# Patient Record
Sex: FEMALE | Race: WHITE | Employment: OTHER | URBAN - METROPOLITAN AREA
[De-identification: names, ages, dates, MRNs, and addresses within clinical notes are randomized per-mention and may not be internally consistent; named-entity substitution may affect disease eponyms.]

---

## 2018-05-02 DIAGNOSIS — Z12.31 VISIT FOR SCREENING MAMMOGRAM: Primary | ICD-10-CM

## 2018-05-02 DIAGNOSIS — Z85.3 PERSONAL HISTORY OF BREAST CANCER: ICD-10-CM

## 2018-05-25 ASSESSMENT — ENCOUNTER SYMPTOMS
BLOOD IN STOOL: 0
ABDOMINAL DISTENTION: 0
EYE DISCHARGE: 0
NAUSEA: 0
COLOR CHANGE: 0
TROUBLE SWALLOWING: 0
COUGH: 0
SORE THROAT: 0
EYE ITCHING: 0
SINUS PRESSURE: 0
CHOKING: 0
RHINORRHEA: 0
ABDOMINAL PAIN: 0
DIARRHEA: 0
CONSTIPATION: 0
VOMITING: 0
SHORTNESS OF BREATH: 0
BACK PAIN: 0
WHEEZING: 0
VOICE CHANGE: 0
SINUS PAIN: 0
ROS SKIN COMMENTS: DENIES BREAST SKIN CHANGES, ARM SWELLING, OR PALPABLE AXILLARY OR SUPRACLAVICULAR ADENOPATHY.
CHEST TIGHTNESS: 0

## 2018-06-07 ENCOUNTER — HOSPITAL ENCOUNTER (OUTPATIENT)
Dept: GENERAL RADIOLOGY | Age: 58
Discharge: HOME | End: 2018-06-09
Payer: COMMERCIAL

## 2018-06-07 ENCOUNTER — OFFICE VISIT (OUTPATIENT)
Dept: BREAST CENTER | Age: 58
End: 2018-06-07
Payer: COMMERCIAL

## 2018-06-07 VITALS
DIASTOLIC BLOOD PRESSURE: 78 MMHG | SYSTOLIC BLOOD PRESSURE: 120 MMHG | TEMPERATURE: 98 F | BODY MASS INDEX: 31.93 KG/M2 | OXYGEN SATURATION: 98 % | HEART RATE: 88 BPM | RESPIRATION RATE: 16 BRPM | WEIGHT: 173.5 LBS | HEIGHT: 62 IN

## 2018-06-07 DIAGNOSIS — Z12.31 VISIT FOR SCREENING MAMMOGRAM: ICD-10-CM

## 2018-06-07 DIAGNOSIS — Z85.3 PERSONAL HISTORY OF BREAST CANCER: ICD-10-CM

## 2018-06-07 DIAGNOSIS — D05.12 DUCTAL CARCINOMA IN SITU (DCIS) OF LEFT BREAST: Primary | ICD-10-CM

## 2018-06-07 PROCEDURE — 99213 OFFICE O/P EST LOW 20 MIN: CPT | Performed by: NURSE PRACTITIONER

## 2018-06-07 PROCEDURE — 77067 SCR MAMMO BI INCL CAD: CPT

## 2019-05-23 ASSESSMENT — ENCOUNTER SYMPTOMS
CHEST TIGHTNESS: 0
BLOOD IN STOOL: 0
ABDOMINAL DISTENTION: 0
ABDOMINAL PAIN: 0
NAUSEA: 0
WHEEZING: 0
SINUS PAIN: 0
RHINORRHEA: 0
VOICE CHANGE: 0
SORE THROAT: 0
CHOKING: 0
SHORTNESS OF BREATH: 0
TROUBLE SWALLOWING: 0
VOMITING: 0
SINUS PRESSURE: 0
ROS SKIN COMMENTS: DENIES BREAST SKIN CHANGES, ARM SWELLING, OR PALPABLE AXILLARY OR SUPRACLAVICULAR ADENOPATHY.
CONSTIPATION: 0
EYE ITCHING: 0
BACK PAIN: 0
EYE DISCHARGE: 0
COLOR CHANGE: 0
COUGH: 0
DIARRHEA: 0

## 2019-05-23 NOTE — PROGRESS NOTES
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 Patient ID: Emmanuel Robledo is a 60 y.o. female. HPI        She underwent left breast needle localized lumpectomy on February 27, 2013. 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 Surgical margins negative for malignancy. Estrogen Receptor (ER) Negative (0%), Progesterone Receptor (IN) Negative (0%). TNM classification- pTis, pNX, pMX. Estimated body mass index is 31.73 kg/m² as calculated from the following:    Height as of 6/7/18: 5' 2\" (1.575 m). Weight as of 6/7/18: 173 lb 8 oz (78.7 kg). 3/26/14 Medical Oncology update, Cheri Monzon MD:   Chance Good is a very pleasant 46 yo healthy female with newly diagnosed DCIS, that is high nuclear grade and ER/IN negative. Size is small. She underwent lumpectomy on 2/27/13. Healing well from her surgery  The benefit of tamoxifen in ER/IN negative DCIS is highly questionable. In large retrospective analysis of tamoxifen vs placebo for DCIS, a statistically significant reduction in any breast cancer events. However, ER/IN negative DCIS had only a non-statistically significant trend toward reduction in breast cancer events, only likely because of the high false negative ER status testing (condordance rate between institutiona and central testing was 70%). Therefore, tamoxifen will likely not affect her recurrence risk with hormone receptor negative DCIS. Adjuvant WBRT was 4/22/13 through 6/17/13. May 2014 Bilateral Diagnostic Mammogram  MAMMOGRAM FINDINGS:   There is a post-surgical scar seen in the central region of the left breast.   No suspicious masses, calcifications, or other abnormalities are seen. IMPRESSION:   Post-surgical scar in the left breast is benign. Screening mammogram in 1 year is recommended.      4/13/2015 Bilateral diagnostic mammogram, [facility]  MAMMOGRAM FINDINGS:   There are new are normal. Right eye exhibits no discharge. Left eye exhibits no discharge. No scleral icterus. Neck: Normal range of motion. Neck supple. No JVD present. No tracheal deviation present. No thyromegaly present. Cardiovascular: Normal rate and regular rhythm. Exam reveals no gallop and no friction rub. No murmur heard. Pulmonary/Chest: Effort normal and breath sounds normal. No stridor. No respiratory distress. She has no wheezes. She has no rales. She exhibits no mass, no tenderness, no bony tenderness, no laceration, no edema, no deformity, no swelling and no retraction. Right breast exhibits no inverted nipple, no mass, no nipple discharge, no skin change and no tenderness. Left breast exhibits no inverted nipple, no mass, no nipple discharge, no skin change and no tenderness. Breasts are symmetrical.   Breasts are supple bilaterally. No skin dimpling or puckering. No nipple discharge. No clinically suspicious  lumps nodules or masses appreciated. No axillary lymphadenopathy. Abdominal: Soft. She exhibits no distension. There is no tenderness. There is no rebound and no guarding. Musculoskeletal: Normal range of motion. She exhibits no edema, tenderness or deformity. Right shoulder: Normal.        Left shoulder: Normal.   Lymphadenopathy:     She has no cervical adenopathy. Right cervical: No superficial cervical, no deep cervical and no posterior cervical adenopathy present. Left cervical: No superficial cervical, no deep cervical and no posterior cervical adenopathy present. She has no axillary adenopathy. Right axillary: No pectoral and no lateral adenopathy present. Left axillary: No pectoral and no lateral adenopathy present. Right: No supraclavicular adenopathy present. Left: No supraclavicular adenopathy present. Neurological: She is alert and oriented to person, place, and time. Coordination normal.   Skin: Skin is warm and dry.  No rash noted. She is not diaphoretic. No erythema. No pallor. Psychiatric: She has a normal mood and affect. Her behavior is normal. Judgment and thought content normal.   Nursing note and vitals reviewed. Assessment:      60 y.o. woman without unusual risk factors for carcinoma of the breast, who underwent left breast needle localized lumpectomy on February 27, 2013. 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 Surgical margins negative for malignancy. Estrogen Receptor Negative (0%), Progesterone Receptor Negative (0%), TNM classification- pTis, pNX, pMX.     -Completed RT June 17, 2013.   -Bilateral screening mammogram today, 06/10/2019:  Benign.        -Clinically, she continues to do well and there is no  evidence of recurrence.  -Follows with Dr. Belkys Felix and Dr. Uli Ewing. Reviewed NCCN guidelines for follow up:            Plan:      1. Continue monthly breast self examination. Bring any changes to your physician's attention. 2. Continue healthy diet and exercise routinely as tolerated. 3. Avoid alcohol or limit alcohol intake to < 1 drink per day. 4. Repeat mammogram 1 year. (ordered). 5. Continue follow up with Primary Care. 6. RTC Prn.  7. Follow up with  and Dr. Uli Ewing as directed. During today's visit, face-to-face time 15 minutes, greater than 50% in counseling education and coordination of care. All questions were answered to her apparent satisfaction, and she is agreeable to the plan as outlined above. Suzan Ruiz, RN, MSN, ACNP-BC, AOCNP  Advanced Oncology Certified Nurse Practitioner  Department of Breast Surgery  [facility]/  [facility] in collaboration with Dr. Rudolph Zamudio.  Tobin Gudino

## 2019-06-10 ENCOUNTER — HOSPITAL ENCOUNTER (OUTPATIENT)
Dept: GENERAL RADIOLOGY | Age: 59
Discharge: HOME | End: 2019-06-12
Payer: COMMERCIAL

## 2019-06-10 ENCOUNTER — OFFICE VISIT (OUTPATIENT)
Dept: BREAST CENTER | Age: 59
End: 2019-06-10
Payer: COMMERCIAL

## 2019-06-10 VITALS
WEIGHT: 172 LBS | HEART RATE: 68 BPM | SYSTOLIC BLOOD PRESSURE: 108 MMHG | DIASTOLIC BLOOD PRESSURE: 62 MMHG | TEMPERATURE: 98.7 F | RESPIRATION RATE: 16 BRPM | OXYGEN SATURATION: 98 % | BODY MASS INDEX: 31.65 KG/M2 | HEIGHT: 62 IN

## 2019-06-10 DIAGNOSIS — Z85.3 PERSONAL HISTORY OF BREAST CANCER: ICD-10-CM

## 2019-06-10 PROCEDURE — 77063 BREAST TOMOSYNTHESIS BI: CPT

## 2019-06-10 PROCEDURE — 99213 OFFICE O/P EST LOW 20 MIN: CPT | Performed by: NURSE PRACTITIONER

## 2019-06-10 RX ORDER — LISINOPRIL 5 MG/1
5 TABLET ORAL DAILY
COMMUNITY
End: 2022-01-18 | Stop reason: ALTCHOICE

## 2020-08-30 ENCOUNTER — APPOINTMENT (OUTPATIENT)
Dept: GENERAL RADIOLOGY | Age: 60
End: 2020-08-30
Payer: COMMERCIAL

## 2020-08-30 ENCOUNTER — HOSPITAL ENCOUNTER (EMERGENCY)
Age: 60
Discharge: HOME | End: 2020-08-30
Attending: EMERGENCY MEDICINE
Payer: COMMERCIAL

## 2020-08-30 VITALS
WEIGHT: 172 LBS | TEMPERATURE: 98 F | HEART RATE: 70 BPM | BODY MASS INDEX: 31.65 KG/M2 | DIASTOLIC BLOOD PRESSURE: 72 MMHG | SYSTOLIC BLOOD PRESSURE: 120 MMHG | HEIGHT: 62 IN | RESPIRATION RATE: 20 BRPM | OXYGEN SATURATION: 98 %

## 2020-08-30 PROCEDURE — 99283 EMERGENCY DEPT VISIT LOW MDM: CPT

## 2020-08-30 PROCEDURE — 6370000000 HC RX 637 (ALT 250 FOR IP): Performed by: EMERGENCY MEDICINE

## 2020-08-30 PROCEDURE — 94640 AIRWAY INHALATION TREATMENT: CPT

## 2020-08-30 PROCEDURE — 99284 EMERGENCY DEPT VISIT MOD MDM: CPT

## 2020-08-30 PROCEDURE — 71045 X-RAY EXAM CHEST 1 VIEW: CPT

## 2020-08-30 PROCEDURE — 94664 DEMO&/EVAL PT USE INHALER: CPT

## 2020-08-30 RX ORDER — METHYLPREDNISOLONE 4 MG/1
TABLET ORAL
Qty: 1 KIT | Refills: 0 | Status: SHIPPED | OUTPATIENT
Start: 2020-08-30 | End: 2020-09-05

## 2020-08-30 RX ORDER — PREDNISONE 20 MG/1
60 TABLET ORAL ONCE
Status: COMPLETED | OUTPATIENT
Start: 2020-08-30 | End: 2020-08-30

## 2020-08-30 RX ORDER — IPRATROPIUM BROMIDE AND ALBUTEROL SULFATE 2.5; .5 MG/3ML; MG/3ML
3 SOLUTION RESPIRATORY (INHALATION) ONCE
Status: COMPLETED | OUTPATIENT
Start: 2020-08-30 | End: 2020-08-30

## 2020-08-30 RX ADMIN — PREDNISONE 60 MG: 20 TABLET ORAL at 12:59

## 2020-08-30 RX ADMIN — IPRATROPIUM BROMIDE AND ALBUTEROL SULFATE 3 AMPULE: .5; 3 SOLUTION RESPIRATORY (INHALATION) at 13:14

## 2020-08-30 ASSESSMENT — ENCOUNTER SYMPTOMS
ORTHOPNEA: 0
RHINORRHEA: 0
WHEEZING: 1
SWOLLEN GLANDS: 0
CHEST TIGHTNESS: 0
SHORTNESS OF BREATH: 0
SORE THROAT: 0
SPUTUM PRODUCTION: 0

## 2020-08-30 NOTE — ED NOTES
Bed: 02  Expected date:   Expected time:   Means of arrival:   Comments:  Low acuity     Elmer Vang RN  08/30/20 0096

## 2020-08-30 NOTE — ED PROVIDER NOTES
79-year-old female presents emergency department with cough and wheezing. Patient states this been going on for several months but seems like it is little bit worse over the last day or 2. 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 States no other complaints at this time. States no leg swelling no history of PEs. The history is provided by the patient. Wheezing   Severity:  Mild  Severity compared to prior episodes:  Less severe  Onset quality:  Gradual  Timing:  Intermittent  Progression:  Waxing and waning  Chronicity:  New  Relieved by:  Nothing  Worsened by:  Nothing  Ineffective treatments:  None tried  Associated symptoms: no chest pain, no chest tightness, no fatigue, no fever, no headaches, no orthopnea, no PND, no rhinorrhea, no shortness of breath, no sore throat, no sputum production and no swollen glands         Review of Systems   Constitutional: Negative for fatigue and fever. HENT: Negative for rhinorrhea and sore throat. Respiratory: Positive for wheezing. Negative for sputum production, chest tightness and shortness of breath. Cardiovascular: Negative for chest pain, orthopnea and PND. Neurological: Negative for headaches. Physical Exam  Constitutional:       Appearance: Normal appearance. HENT:      Head: Normocephalic and atraumatic. Mouth/Throat:      Mouth: Mucous membranes are moist.   Eyes:      Extraocular Movements: Extraocular movements intact. Pupils: Pupils are equal, round, and reactive to light. Cardiovascular:      Rate and Rhythm: Normal rate and regular rhythm. Pulses: Normal pulses. Heart sounds: Normal heart sounds. Pulmonary:      Effort: No respiratory distress. Breath sounds: Wheezing present. Abdominal:      General: Abdomen is flat. Bowel sounds are normal.      Palpations: Abdomen is soft. Tenderness:  There is no abdominal tenderness. Musculoskeletal:      Right lower leg: No edema. Left lower leg: No edema. Neurological:      Mental Status: She is alert. Procedures     MDM  Number of Diagnoses or Management Options  Mild intermittent asthma with exacerbation:   Diagnosis management comments: Patient seen and examined. Patient appears to be having a mild asthma exacerbation. She was given breathing treatments and steroid a chest x-ray was evaluated was found to be reassuring patient had improvement of symptoms here in the emergency department she was ambulated without hypoxia and the patient was felt safe for discharge with treatment with an inhaler and short course of steroids. --------------------------------------------- PAST HISTORY ---------------------------------------------  Past Medical History:  has a past medical history of Abdominal pain, Cancer ([facility] [address] GERD (gastroesophageal reflux disease), Hyperlipidemia, Hypertension, and Vision disturbance. Past Surgical History:  has a past surgical history that includes Tonsillectomy; Breast biopsy (Left, 1/14/13); Breast surgery (Left, 2/27/13); Upper gastrointestinal endoscopy (2005); Colonoscopy (2010); and Colonoscopy (03/01/2017). Social History:  reports that she has never smoked. She has never used smokeless tobacco. She reports current alcohol use. She reports that she does not use drugs.     Family History: family history includes Arthritis in her maternal grandmother, paternal aunt, and paternal grandmother; Cancer in her paternal grandmother; Diabetes in her paternal aunt and paternal grandmother; Heart Disease in her father, paternal aunt, paternal grandmother, and paternal uncle; High Blood Pressure in her maternal aunt, mother, paternal aunt, paternal grandmother, paternal uncle, and sister; High Cholesterol in her father, paternal aunt, paternal grandmother, and sister; Kidney Disease in her paternal grandfather; Stroke in her paternal aunt. The patients home medications have been reviewed. Allergies: Sulfur    -------------------------------------------------- RESULTS -------------------------------------------------  Labs:  No results found for this visit on 08/30/20. Radiology:  XR CHEST PORTABLE   Final Result   No acute cardiopulmonary disease process is identified.                         ------------------------- NURSING NOTES AND VITALS REVIEWED ---------------------------  Date / Time Roomed:  8/30/2020 11:56 AM  ED Bed Assignment:  02/02    The nursing notes within the ED encounter and vital signs as below have been reviewed. /72   Pulse 70   Temp 98 °F (36.7 °C)   Resp 20   Ht 5' 2\" (1.575 m)   Wt 172 lb (78 kg)   SpO2 98%   BMI 31.46 kg/m²   Oxygen Saturation Interpretation: Normal      ------------------------------------------ PROGRESS NOTES ------------------------------------------  I have spoken with the patient and discussed todays results, in addition to providing specific details for the plan of care and counseling regarding the diagnosis and prognosis. Their questions are answered at this time and they are agreeable with the plan. I discussed at length with them reasons for immediate return here for re evaluation. They will followup with their primary care physician by calling their office tomorrow. --------------------------------- ADDITIONAL PROVIDER NOTES ---------------------------------  At this time the patient is without objective evidence of an acute process requiring hospitalization or inpatient management. They have remained hemodynamically stable throughout their entire ED visit and are stable for discharge with outpatient follow-up. The plan has been discussed in detail and they are aware of the specific conditions for emergent return, as well as the importance of follow-up.       New Prescriptions    ALBUTEROL-IPRATROPIUM (COMBIVENT RESPIMAT)  MCG/ACT AERS INHALER    Inhale 1 puff into the lungs every 6 hours    METHYLPREDNISOLONE (MEDROL, GLENIS,) 4 MG TABLET    Take by mouth. Diagnosis:  1. Mild intermittent asthma with exacerbation        Disposition:  Patient's disposition: Discharge to home  Patient's condition is stable.          Antionette Villanueva DO  08/30/20 1359

## 2020-08-31 ENCOUNTER — CARE COORDINATION (OUTPATIENT)
Dept: CASE MANAGEMENT | Age: 60
End: 2020-08-31

## 2020-08-31 NOTE — CARE COORDINATION
Covid-19 Initial Outreach call, no answer.   Left VM with contact information and request  for return call at [address] [facility], [address] [facility] Coordination Transition

## 2020-09-01 ENCOUNTER — CARE COORDINATION (OUTPATIENT)
Dept: CASE MANAGEMENT | Age: 60
End: 2020-09-01

## 2020-09-01 NOTE — CARE COORDINATION
Covid-19 2nd Outreach call, no answer.   Left VM with contact information and request  for return call at [address], [address] [place] [address] Coordination Transition

## 2020-11-05 ENCOUNTER — HOSPITAL ENCOUNTER (OUTPATIENT)
Dept: GENERAL RADIOLOGY | Age: 60
Discharge: HOME | End: 2020-11-07
Payer: COMMERCIAL

## 2020-11-05 PROCEDURE — 77063 BREAST TOMOSYNTHESIS BI: CPT

## 2020-12-17 ENCOUNTER — APPOINTMENT (OUTPATIENT)
Dept: GENERAL RADIOLOGY | Age: 60
End: 2020-12-17
Payer: COMMERCIAL

## 2020-12-17 ENCOUNTER — HOSPITAL ENCOUNTER (EMERGENCY)
Age: 60
Discharge: HOME | End: 2020-12-17
Attending: EMERGENCY MEDICINE
Payer: COMMERCIAL

## 2020-12-17 VITALS
WEIGHT: 150 LBS | DIASTOLIC BLOOD PRESSURE: 88 MMHG | SYSTOLIC BLOOD PRESSURE: 136 MMHG | TEMPERATURE: 97.9 F | RESPIRATION RATE: 16 BRPM | OXYGEN SATURATION: 98 % | BODY MASS INDEX: 27.6 KG/M2 | HEIGHT: 62 IN | HEART RATE: 90 BPM

## 2020-12-17 LAB
ALBUMIN SERPL-MCNC: 3.9 G/DL (ref 3.5–5.2)
ALP BLD-CCNC: 62 U/L (ref 35–104)
ALT SERPL-CCNC: 38 U/L (ref 0–32)
ANION GAP SERPL CALCULATED.3IONS-SCNC: 11 MMOL/L (ref 7–16)
APTT: 28.3 SEC (ref 24.5–35.1)
AST SERPL-CCNC: 28 U/L (ref 0–31)
BACTERIA: ABNORMAL /HPF
BILIRUB SERPL-MCNC: 0.5 MG/DL (ref 0–1.2)
BILIRUBIN URINE: NEGATIVE
BLOOD, URINE: ABNORMAL
BUN BLDV-MCNC: 10 MG/DL (ref 8–23)
CALCIUM SERPL-MCNC: 9.9 MG/DL (ref 8.6–10.2)
CHLORIDE BLD-SCNC: 99 MMOL/L (ref 98–107)
CLARITY: CLEAR
CO2: 27 MMOL/L (ref 22–29)
COLOR: YELLOW
CREAT SERPL-MCNC: 0.8 MG/DL (ref 0.5–1)
EKG ATRIAL RATE: 80 BPM
EKG P AXIS: 40 DEGREES
EKG P-R INTERVAL: 126 MS
EKG Q-T INTERVAL: 378 MS
EKG QRS DURATION: 76 MS
EKG QTC CALCULATION (BAZETT): 435 MS
EKG R AXIS: 21 DEGREES
EKG T AXIS: 30 DEGREES
EKG VENTRICULAR RATE: 80 BPM
GFR AFRICAN AMERICAN: >60
GFR NON-AFRICAN AMERICAN: >60 ML/MIN/1.73
GLUCOSE BLD-MCNC: 111 MG/DL (ref 74–99)
GLUCOSE URINE: NEGATIVE MG/DL
HCT VFR BLD CALC: 40.6 % (ref 34–48)
HEMOGLOBIN: 13.5 G/DL (ref 11.5–15.5)
INR BLD: 1
KETONES, URINE: NEGATIVE MG/DL
LACTIC ACID: 1.6 MMOL/L (ref 0.5–2.2)
LEUKOCYTE ESTERASE, URINE: ABNORMAL
MCH RBC QN AUTO: 29.6 PG (ref 26–35)
MCHC RBC AUTO-ENTMCNC: 33.3 % (ref 32–34.5)
MCV RBC AUTO: 89 FL (ref 80–99.9)
NITRITE, URINE: NEGATIVE
PDW BLD-RTO: 13.2 FL (ref 11.5–15)
PH UA: 6 (ref 5–9)
PLATELET # BLD: 297 E9/L (ref 130–450)
PMV BLD AUTO: 9 FL (ref 7–12)
POTASSIUM SERPL-SCNC: 3.3 MMOL/L (ref 3.5–5)
PROTEIN UA: NEGATIVE MG/DL
PROTHROMBIN TIME: 10.9 SEC (ref 9.3–12.4)
RBC # BLD: 4.56 E12/L (ref 3.5–5.5)
RBC UA: ABNORMAL /HPF (ref 0–2)
SODIUM BLD-SCNC: 137 MMOL/L (ref 132–146)
SPECIFIC GRAVITY UA: 1.01 (ref 1–1.03)
STREP GRP A PCR: NEGATIVE
TOTAL PROTEIN: 6.8 G/DL (ref 6.4–8.3)
TROPONIN: <0.01 NG/ML (ref 0–0.03)
UROBILINOGEN, URINE: 0.2 E.U./DL
WBC # BLD: 9.1 E9/L (ref 4.5–11.5)
WBC UA: ABNORMAL /HPF (ref 0–5)

## 2020-12-17 PROCEDURE — 99284 EMERGENCY DEPT VISIT MOD MDM: CPT

## 2020-12-17 PROCEDURE — 87880 STREP A ASSAY W/OPTIC: CPT

## 2020-12-17 PROCEDURE — 85027 COMPLETE CBC AUTOMATED: CPT

## 2020-12-17 PROCEDURE — 81001 URINALYSIS AUTO W/SCOPE: CPT

## 2020-12-17 PROCEDURE — 80053 COMPREHEN METABOLIC PANEL: CPT

## 2020-12-17 PROCEDURE — 85730 THROMBOPLASTIN TIME PARTIAL: CPT

## 2020-12-17 PROCEDURE — 93010 ELECTROCARDIOGRAM REPORT: CPT | Performed by: INTERNAL MEDICINE

## 2020-12-17 PROCEDURE — 36415 COLL VENOUS BLD VENIPUNCTURE: CPT

## 2020-12-17 PROCEDURE — 85610 PROTHROMBIN TIME: CPT

## 2020-12-17 PROCEDURE — 93005 ELECTROCARDIOGRAM TRACING: CPT | Performed by: EMERGENCY MEDICINE

## 2020-12-17 PROCEDURE — 84484 ASSAY OF TROPONIN QUANT: CPT

## 2020-12-17 PROCEDURE — 6370000000 HC RX 637 (ALT 250 FOR IP): Performed by: EMERGENCY MEDICINE

## 2020-12-17 PROCEDURE — 83605 ASSAY OF LACTIC ACID: CPT

## 2020-12-17 PROCEDURE — 71046 X-RAY EXAM CHEST 2 VIEWS: CPT

## 2020-12-17 RX ORDER — OMEPRAZOLE 20 MG/1
20 CAPSULE, DELAYED RELEASE ORAL DAILY
Qty: 30 CAPSULE | Refills: 0 | Status: SHIPPED | OUTPATIENT
Start: 2020-12-17 | End: 2022-04-26 | Stop reason: ALTCHOICE

## 2020-12-17 RX ORDER — SERTRALINE HYDROCHLORIDE 100 MG/1
100 TABLET, FILM COATED ORAL DAILY
COMMUNITY
End: 2021-09-21 | Stop reason: SDUPTHER

## 2020-12-17 RX ORDER — CEFDINIR 300 MG/1
300 CAPSULE ORAL 2 TIMES DAILY
COMMUNITY
End: 2021-07-16 | Stop reason: ALTCHOICE

## 2020-12-17 RX ADMIN — LIDOCAINE HYDROCHLORIDE: 20 SOLUTION ORAL; TOPICAL at 04:50

## 2020-12-17 NOTE — ED NOTES
Pt presents with feeling SOB and having issues of \"GERD\". No vomiting diarrhea or fevers. On cefdinir for bladder and sinus infection.      Mando Julian RN  12/17/20 7538

## 2020-12-17 NOTE — ED PROVIDER NOTES
grandmother, paternal uncle, and sister; High Cholesterol in her father, paternal aunt, paternal grandmother, and sister; Kidney Disease in her paternal grandfather; Stroke in her paternal aunt. The patients home medications have been reviewed.     Allergies: Sulfur    -------------------------------------------------- RESULTS -------------------------------------------------  All laboratory and radiology results have been personally reviewed by myself   LABS:  Results for orders placed or performed during the hospital encounter of 12/17/20   Strep Screen Group A Throat    Specimen: Throat   Result Value Ref Range    Strep Grp A PCR Negative Negative   CBC   Result Value Ref Range    WBC 9.1 4.5 - 11.5 E9/L    RBC 4.56 3.50 - 5.50 E12/L    Hemoglobin 13.5 11.5 - 15.5 g/dL    Hematocrit 40.6 34.0 - 48.0 %    MCV 89.0 80.0 - 99.9 fL    MCH 29.6 26.0 - 35.0 pg    MCHC 33.3 32.0 - 34.5 %    RDW 13.2 11.5 - 15.0 fL    Platelets 673 648 - 364 E9/L    MPV 9.0 7.0 - 12.0 fL   Comprehensive metabolic panel   Result Value Ref Range    Sodium 137 132 - 146 mmol/L    Potassium 3.3 (L) 3.5 - 5.0 mmol/L    Chloride 99 98 - 107 mmol/L    CO2 27 22 - 29 mmol/L    Anion Gap 11 7 - 16 mmol/L    Glucose 111 (H) 74 - 99 mg/dL    BUN 10 8 - 23 mg/dL    CREATININE 0.8 0.5 - 1.0 mg/dL    GFR Non-African American >60 >=60 mL/min/1.73    GFR African American >60     Calcium 9.9 8.6 - 10.2 mg/dL    Total Protein 6.8 6.4 - 8.3 g/dL    Alb 3.9 3.5 - 5.2 g/dL    Total Bilirubin 0.5 0.0 - 1.2 mg/dL    Alkaline Phosphatase 62 35 - 104 U/L    ALT 38 (H) 0 - 32 U/L    AST 28 0 - 31 U/L   Troponin   Result Value Ref Range    Troponin <0.01 0.00 - 0.03 ng/mL   Lactic acid, plasma   Result Value Ref Range    Lactic Acid 1.6 0.5 - 2.2 mmol/L   Protime-INR   Result Value Ref Range    Protime 10.9 9.3 - 12.4 sec    INR 1.0    APTT   Result Value Ref Range    aPTT 28.3 24.5 - 35.1 sec   Urinalysis with Microscopic   Result Value Ref Range    Color, UA Yellow Straw/Yellow    Clarity, UA Clear Clear    Glucose, Ur Negative Negative mg/dL    Bilirubin Urine Negative Negative    Ketones, Urine Negative Negative mg/dL    Specific Gravity, UA 1.015 1.005 - 1.030    Blood, Urine SMALL (A) Negative    pH, UA 6.0 5.0 - 9.0    Protein, UA Negative Negative mg/dL    Urobilinogen, Urine 0.2 <2.0 E.U./dL    Nitrite, Urine Negative Negative    Leukocyte Esterase, Urine TRACE (A) Negative    WBC, UA 1-3 0 - 5 /HPF    RBC, UA 1-3 0 - 2 /HPF    Bacteria, UA RARE (A) None Seen /HPF   EKG 12 Lead   Result Value Ref Range    Ventricular Rate 80 BPM    Atrial Rate 80 BPM    P-R Interval 126 ms    QRS Duration 76 ms    Q-T Interval 378 ms    QTc Calculation (Bazett) 435 ms    P Axis 40 degrees    R Axis 21 degrees    T Axis 30 degrees       RADIOLOGY:  Interpreted by Radiologist.  XR CHEST (2 VW)   Final Result   There is no acute abnormality seen. ------------------------- NURSING NOTES AND VITALS REVIEWED ---------------------------    The nursing notes within the ED encounter and vital signs as below have been reviewed. /88   Pulse 90   Temp 97.9 °F (36.6 °C) (Temporal)   Resp 16   Ht 5' 2\" (1.575 m)   Wt 150 lb (68 kg)   SpO2 98%   BMI 27.44 kg/m²  Oxygen Saturation Interpretation: Normal      ---------------------------------------------------PHYSICAL EXAM--------------------------------------      Constitutional/General: Alert and oriented x3, well appearing, non toxic in NAD  Head: Normocephalic and atraumatic  Eyes: PERRL, EOMI  Mouth: Oropharynx clear, handling secretions, no trismus  Neck: Supple, full ROM, no stridor, no dysphonia. Trachea midline. Pulmonary: Lungs clear to auscultation bilaterally, no wheezes, rales, or rhonchi. Not in respiratory distress  Cardiovascular:  Regular rate and rhythm, no murmurs, gallops, or rubs. 2+ distal pulses  Abdomen: Soft, non tender, non distended, no HPSM, no masses, no rebound, no guarding, no rigidity. Normal BS. Extremities: Moves all extremities x 4. Warm and well perfused; no calf tenderness, no clinical signs of DVT. Skin: warm and dry without rash  Neurologic: GCS 15, CN's grossly intact, no focal deficits. Psych: Normal Affect    ------------------------------ ED COURSE/MEDICAL DECISION MAKING----------------------  Medications   aluminum & magnesium hydroxide-simethicone (MAALOX) 30 mL, lidocaine viscous hcl (XYLOCAINE) 5 mL (GI COCKTAIL) ( Oral Given 12/17/20 2848)         ED COURSE:       Medical Decision Making:   Differential Diagnoses:  GERD, Pneumonia, Viral Illness, Strep Pharyngitis, to name a few. EKG #1:  Interpreted by emergency department physician unless otherwise noted. Time:  04:10    Rate: 80 bpm  Rhythm: Sinus rhythm. Interpretation: Sinus rhythm  and with nonspecific ST segment and T wave findings. Comparison: There are no previous tracings available for comparison. Counseling: The emergency provider has spoken with the patient and discussed todays results, in addition to providing specific details for the plan of care and counseling regarding the diagnosis and prognosis. Questions are answered at this time and they are agreeable with the plan.      --------------------------------- IMPRESSION AND DISPOSITION ---------------------------------    IMPRESSION  1. Viral pharyngitis    2. Gastroesophageal reflux disease without esophagitis        DISPOSITION  Disposition: Discharge to home  Patient condition is stable      NOTE: This report was transcribed using voice recognition software.  Every effort was made to ensure accuracy; however, inadvertent computerized transcription errors may be present        Mina Chavez MD  12/17/20 1076

## 2021-06-22 ENCOUNTER — TELEPHONE (OUTPATIENT)
Dept: ADMINISTRATIVE | Age: 61
End: 2021-06-22

## 2021-06-22 NOTE — TELEPHONE ENCOUNTER
Patient states her sister, Rebeca Wiggins ([id number]) was in office this morning and advised her to call and talk with Yareli Montes. Attempted to call office, staff unavailable due to patient care. Please contact at [phone]. Thank you in advance.

## 2021-07-16 ENCOUNTER — OFFICE VISIT (OUTPATIENT)
Dept: PRIMARY CARE CLINIC | Age: 61
End: 2021-07-16
Payer: COMMERCIAL

## 2021-07-16 VITALS
WEIGHT: 157 LBS | HEART RATE: 76 BPM | OXYGEN SATURATION: 96 % | BODY MASS INDEX: 28.72 KG/M2 | TEMPERATURE: 97.1 F | SYSTOLIC BLOOD PRESSURE: 132 MMHG | DIASTOLIC BLOOD PRESSURE: 80 MMHG

## 2021-07-16 DIAGNOSIS — R05.8 COUGH PRODUCTIVE OF PURULENT SPUTUM: Primary | ICD-10-CM

## 2021-07-16 DIAGNOSIS — J40 BRONCHITIS: ICD-10-CM

## 2021-07-16 PROCEDURE — G8419 CALC BMI OUT NRM PARAM NOF/U: HCPCS | Performed by: FAMILY MEDICINE

## 2021-07-16 PROCEDURE — 1036F TOBACCO NON-USER: CPT | Performed by: FAMILY MEDICINE

## 2021-07-16 PROCEDURE — 99203 OFFICE O/P NEW LOW 30 MIN: CPT | Performed by: FAMILY MEDICINE

## 2021-07-16 PROCEDURE — G8427 DOCREV CUR MEDS BY ELIG CLIN: HCPCS | Performed by: FAMILY MEDICINE

## 2021-07-16 PROCEDURE — 3017F COLORECTAL CA SCREEN DOC REV: CPT | Performed by: FAMILY MEDICINE

## 2021-07-16 RX ORDER — BUDESONIDE AND FORMOTEROL FUMARATE DIHYDRATE 80; 4.5 UG/1; UG/1
2 AEROSOL RESPIRATORY (INHALATION) 2 TIMES DAILY
Qty: 1 INHALER | Refills: 0 | Status: SHIPPED
Start: 2021-07-16 | End: 2021-08-13

## 2021-07-16 RX ORDER — AMOXICILLIN 500 MG/1
500 CAPSULE ORAL 3 TIMES DAILY
Qty: 30 CAPSULE | Refills: 0 | Status: SHIPPED | OUTPATIENT
Start: 2021-07-16 | End: 2021-07-26

## 2021-07-16 ASSESSMENT — ENCOUNTER SYMPTOMS
WHEEZING: 1
SHORTNESS OF BREATH: 1
EYE DISCHARGE: 1
SORE THROAT: 0
COUGH: 1
RHINORRHEA: 1

## 2021-07-16 ASSESSMENT — PATIENT HEALTH QUESTIONNAIRE - PHQ9
2. FEELING DOWN, DEPRESSED OR HOPELESS: 0
SUM OF ALL RESPONSES TO PHQ QUESTIONS 1-9: 0
SUM OF ALL RESPONSES TO PHQ QUESTIONS 1-9: 0
SUM OF ALL RESPONSES TO PHQ9 QUESTIONS 1 & 2: 0
1. LITTLE INTEREST OR PLEASURE IN DOING THINGS: 0
SUM OF ALL RESPONSES TO PHQ QUESTIONS 1-9: 0

## 2021-07-16 NOTE — PROGRESS NOTES
Nataly Rai, a female of 63 y.o. came to the office 7/16/2021. Patient Active Problem List   Diagnosis    DCIS (ductal carcinoma in situ) of breast    Personal history of breast cancer          Cough  This is a new (thinks it's allergy related. oocurs during day and gets worse at night. ) problem. The current episode started more than 1 month ago (began in Feb. ). The problem has been unchanged. The cough is productive of sputum (whitish). Associated symptoms include nasal congestion, rhinorrhea, shortness of breath and wheezing. Pertinent negatives include no chest pain, chills, fever, postnasal drip or sore throat. Associated symptoms comments: Eyes water. . Exacerbated by: if talks alot. Treatments tried: Tessalon perles. had - cxr in May. The treatment provided no relief. anxiety: on Zoloft recently duet to recent issues of working at [employer] 23 in deaths due to covid. Allergies   Allergen Reactions    Sulfur Anaphylaxis and Other (See Comments)     Sores in mouth, throat closed       Current Outpatient Medications on File Prior to Visit   Medication Sig Dispense Refill    sertraline (ZOLOFT) 100 MG tablet Take 100 mg by mouth daily      omeprazole (PRILOSEC) 20 MG delayed release capsule Take 1 capsule by mouth daily OTC Take am dos 03/01 30 capsule 0    albuterol-ipratropium (COMBIVENT RESPIMAT)  MCG/ACT AERS inhaler Inhale 1 puff into the lungs every 6 hours 1 Inhaler 0    lisinopril (PRINIVIL;ZESTRIL) 5 MG tablet Take 5 mg by mouth daily      hydrochlorothiazide (HYDRODIURIL) 25 MG tablet Take 25 mg by mouth daily      Cholecalciferol (VITAMIN D3) 2000 UNITS CAPS Take 2,000 Units by mouth daily      atorvastatin (LIPITOR) 20 MG tablet Take 20 mg by mouth daily       No current facility-administered medications on file prior to visit. Review of Systems   Constitutional: Negative for chills and fever. HENT: Positive for rhinorrhea.  Negative for postnasal drip and orders for this visit:    Cough productive of purulent sputum  -     budesonide-formoterol (SYMBICORT) 80-4.5 MCG/ACT AERO; Inhale 2 puffs into the lungs 2 times daily    Bronchitis  -     amoxicillin (AMOXIL) 500 MG capsule; Take 1 capsule by mouth 3 times daily for 10 days  -     budesonide-formoterol (SYMBICORT) 80-4.5 MCG/ACT AERO; Inhale 2 puffs into the lungs 2 times daily    - get old records of recent labs and cxr report    Return in about 6 months (around 1/16/2022), or if symptoms worsen or fail to improve in 2 wks. Cristiana Mcdaniels, DO

## 2021-08-13 DIAGNOSIS — J40 BRONCHITIS: ICD-10-CM

## 2021-08-13 DIAGNOSIS — R05.8 COUGH PRODUCTIVE OF PURULENT SPUTUM: ICD-10-CM

## 2021-08-13 RX ORDER — DILTIAZEM HYDROCHLORIDE 60 MG/1
TABLET, FILM COATED ORAL
Qty: 10.2 G | Refills: 3 | Status: SHIPPED
Start: 2021-08-13 | End: 2022-01-18

## 2021-09-08 VITALS
OXYGEN SATURATION: 96 % | HEIGHT: 62 IN | RESPIRATION RATE: 18 BRPM | SYSTOLIC BLOOD PRESSURE: 110 MMHG | WEIGHT: 152 LBS | TEMPERATURE: 97.3 F | HEART RATE: 73 BPM | DIASTOLIC BLOOD PRESSURE: 70 MMHG | BODY MASS INDEX: 27.97 KG/M2

## 2021-09-13 ENCOUNTER — TELEPHONE (OUTPATIENT)
Dept: PRIMARY CARE CLINIC | Age: 61
End: 2021-09-13

## 2021-09-13 NOTE — TELEPHONE ENCOUNTER
----- Message from Elly Cano sent at 9/13/2021  9:23 AM EDT -----  Subject: Message to Provider    QUESTIONS  Information for Provider? wanted to know to leave message- saw pcp in July   for a cough, was given antibiotic and inhaler- but still has the cough --   has [employer] insurance, [address] [place] [address],  [place] [address],   [phone]  ---------------------------------------------------------------------------  --------------  [address]  What is the best way for the office to contact you? OK to leave message on   voicemail  Preferred Call Back Phone Number? [phone]  ---------------------------------------------------------------------------  --------------  SCRIPT ANSWERS  Relationship to Patient?  Self

## 2021-09-15 ENCOUNTER — OFFICE VISIT (OUTPATIENT)
Dept: PRIMARY CARE CLINIC | Age: 61
End: 2021-09-15
Payer: COMMERCIAL

## 2021-09-15 VITALS
SYSTOLIC BLOOD PRESSURE: 128 MMHG | BODY MASS INDEX: 28.9 KG/M2 | HEART RATE: 68 BPM | TEMPERATURE: 97.5 F | WEIGHT: 158 LBS | DIASTOLIC BLOOD PRESSURE: 80 MMHG | OXYGEN SATURATION: 95 %

## 2021-09-15 DIAGNOSIS — R05.3 CHRONIC COUGH: Primary | ICD-10-CM

## 2021-09-15 PROCEDURE — G8419 CALC BMI OUT NRM PARAM NOF/U: HCPCS | Performed by: FAMILY MEDICINE

## 2021-09-15 PROCEDURE — G8427 DOCREV CUR MEDS BY ELIG CLIN: HCPCS | Performed by: FAMILY MEDICINE

## 2021-09-15 PROCEDURE — 99213 OFFICE O/P EST LOW 20 MIN: CPT | Performed by: FAMILY MEDICINE

## 2021-09-15 PROCEDURE — 1036F TOBACCO NON-USER: CPT | Performed by: FAMILY MEDICINE

## 2021-09-15 PROCEDURE — 3017F COLORECTAL CA SCREEN DOC REV: CPT | Performed by: FAMILY MEDICINE

## 2021-09-15 ASSESSMENT — ENCOUNTER SYMPTOMS
SORE THROAT: 0
SHORTNESS OF BREATH: 1
COUGH: 1
RHINORRHEA: 0
WHEEZING: 1

## 2021-09-15 NOTE — PROGRESS NOTES
Regino Uriostegui, a female of 63 y.o. came to the office 9/15/2021. Patient Active Problem List   Diagnosis    DCIS (ductal carcinoma in situ) of breast    Personal history of breast cancer          Cough  This is a chronic problem. The current episode started more than 1 month ago (began in Feb.). The cough is productive of sputum (clear white mucus at times. ). Associated symptoms include nasal congestion, shortness of breath (on occasion) and wheezing. Pertinent negatives include no chills, ear pain, fever, headaches, rhinorrhea or sore throat. The symptoms are aggravated by cold air. She has tried steroid inhaler for the symptoms. The treatment provided mild relief. - on Omnicef and Amoxil in past without relief. Allergies   Allergen Reactions    Sulfur Anaphylaxis and Other (See Comments)     Sores in mouth, throat closed    Doxycycline     Sulfa Antibiotics        Current Outpatient Medications on File Prior to Visit   Medication Sig Dispense Refill    SYMBICORT 80-4.5 MCG/ACT AERO INHALE 2 PUFFS INTO THE LUNGS TWICE DAILY 10.2 g 3    sertraline (ZOLOFT) 100 MG tablet Take 100 mg by mouth daily      omeprazole (PRILOSEC) 20 MG delayed release capsule Take 1 capsule by mouth daily OTC Take am dos 03/01 30 capsule 0    lisinopril (PRINIVIL;ZESTRIL) 5 MG tablet Take 5 mg by mouth daily      hydrochlorothiazide (HYDRODIURIL) 25 MG tablet Take 25 mg by mouth daily      Cholecalciferol (VITAMIN D3) 2000 UNITS CAPS Take 2,000 Units by mouth daily      atorvastatin (LIPITOR) 20 MG tablet Take 20 mg by mouth daily      albuterol-ipratropium (COMBIVENT RESPIMAT)  MCG/ACT AERS inhaler Inhale 1 puff into the lungs every 6 hours 1 Inhaler 0     No current facility-administered medications on file prior to visit. Review of Systems   Constitutional: Negative for chills and fever. HENT: Negative for ear pain, rhinorrhea and sore throat.     Respiratory: Positive for cough, shortness of breath (on occasion) and wheezing. Gastrointestinal:        Taking ppi daily for HB. Neurological: Negative for headaches. other review of systems reviewed and are negative    OBJECTIVE:  /80   Pulse 68   Temp 97.5 °F (36.4 °C)   Wt 158 lb (71.7 kg)   SpO2 95%   BMI 28.90 kg/m²      Physical Exam  Constitutional:       General: She is not in acute distress. HENT:      Right Ear: Tympanic membrane normal.      Left Ear: Tympanic membrane normal.      Nose: No mucosal edema or rhinorrhea. Right Sinus: No maxillary sinus tenderness or frontal sinus tenderness. Left Sinus: No maxillary sinus tenderness or frontal sinus tenderness. Mouth/Throat:      Pharynx: Uvula midline. No oropharyngeal exudate or posterior oropharyngeal erythema. Cardiovascular:      Rate and Rhythm: Normal rate and regular rhythm. Pulmonary:      Effort: Pulmonary effort is normal.      Breath sounds: Normal breath sounds. Musculoskeletal:      Cervical back: Neck supple. Lymphadenopathy:      Cervical: No cervical adenopathy. ASSESSMENT AND PLAN:    Domi Gray was seen today for cough. Diagnoses and all orders for this visit:    Chronic cough    - hold Lisinopril as possible cause of cough  - continue Symbicort bid in meantime. - if cough abates will need to replace ace - with new med if bp's start to rise. Return if symptoms worsen or fail to improve.     Geovanna Mcdaniels, DO

## 2021-09-21 RX ORDER — HYDROCHLOROTHIAZIDE 25 MG/1
25 TABLET ORAL DAILY
Qty: 30 TABLET | Refills: 5 | Status: SHIPPED
Start: 2021-09-21 | End: 2022-03-21

## 2021-09-21 RX ORDER — SERTRALINE HYDROCHLORIDE 100 MG/1
100 TABLET, FILM COATED ORAL DAILY
Qty: 30 TABLET | Refills: 5 | Status: SHIPPED
Start: 2021-09-21 | End: 2022-03-21

## 2021-09-21 RX ORDER — ATORVASTATIN CALCIUM 20 MG/1
20 TABLET, FILM COATED ORAL DAILY
Qty: 30 TABLET | Refills: 5 | Status: SHIPPED
Start: 2021-09-21 | End: 2022-03-21

## 2022-01-18 ENCOUNTER — OFFICE VISIT (OUTPATIENT)
Dept: PRIMARY CARE CLINIC | Age: 62
End: 2022-01-18
Payer: COMMERCIAL

## 2022-01-18 VITALS
DIASTOLIC BLOOD PRESSURE: 80 MMHG | TEMPERATURE: 98 F | BODY MASS INDEX: 28.35 KG/M2 | WEIGHT: 155 LBS | OXYGEN SATURATION: 96 % | SYSTOLIC BLOOD PRESSURE: 128 MMHG | HEART RATE: 88 BPM

## 2022-01-18 DIAGNOSIS — R05.8 COUGH PRODUCTIVE OF PURULENT SPUTUM: ICD-10-CM

## 2022-01-18 DIAGNOSIS — J40 BRONCHITIS: ICD-10-CM

## 2022-01-18 DIAGNOSIS — I10 PRIMARY HYPERTENSION: Primary | ICD-10-CM

## 2022-01-18 DIAGNOSIS — R05.3 CHRONIC COUGH: ICD-10-CM

## 2022-01-18 DIAGNOSIS — E78.00 PURE HYPERCHOLESTEROLEMIA: ICD-10-CM

## 2022-01-18 PROCEDURE — 3017F COLORECTAL CA SCREEN DOC REV: CPT | Performed by: FAMILY MEDICINE

## 2022-01-18 PROCEDURE — 99213 OFFICE O/P EST LOW 20 MIN: CPT | Performed by: FAMILY MEDICINE

## 2022-01-18 PROCEDURE — G8419 CALC BMI OUT NRM PARAM NOF/U: HCPCS | Performed by: FAMILY MEDICINE

## 2022-01-18 PROCEDURE — G8427 DOCREV CUR MEDS BY ELIG CLIN: HCPCS | Performed by: FAMILY MEDICINE

## 2022-01-18 PROCEDURE — 1036F TOBACCO NON-USER: CPT | Performed by: FAMILY MEDICINE

## 2022-01-18 PROCEDURE — G8484 FLU IMMUNIZE NO ADMIN: HCPCS | Performed by: FAMILY MEDICINE

## 2022-01-18 RX ORDER — DILTIAZEM HYDROCHLORIDE 60 MG/1
TABLET, FILM COATED ORAL
Qty: 10.2 G | Refills: 3 | Status: SHIPPED
Start: 2022-01-18 | End: 2022-05-13

## 2022-01-18 RX ORDER — CETIRIZINE HYDROCHLORIDE 10 MG/1
10 TABLET ORAL DAILY
Qty: 6 TABLET | Refills: 0 | Status: ON HOLD | COMMUNITY
Start: 2022-01-18 | End: 2022-09-12 | Stop reason: ALTCHOICE

## 2022-01-18 SDOH — ECONOMIC STABILITY: FOOD INSECURITY: WITHIN THE PAST 12 MONTHS, THE FOOD YOU BOUGHT JUST DIDN'T LAST AND YOU DIDN'T HAVE MONEY TO GET MORE.: NEVER TRUE

## 2022-01-18 SDOH — ECONOMIC STABILITY: FOOD INSECURITY: WITHIN THE PAST 12 MONTHS, YOU WORRIED THAT YOUR FOOD WOULD RUN OUT BEFORE YOU GOT MONEY TO BUY MORE.: NEVER TRUE

## 2022-01-18 ASSESSMENT — ENCOUNTER SYMPTOMS
COUGH: 1
CONSTIPATION: 0
DIARRHEA: 0
SHORTNESS OF BREATH: 0
BLOOD IN STOOL: 0

## 2022-01-18 ASSESSMENT — SOCIAL DETERMINANTS OF HEALTH (SDOH): HOW HARD IS IT FOR YOU TO PAY FOR THE VERY BASICS LIKE FOOD, HOUSING, MEDICAL CARE, AND HEATING?: NOT HARD AT ALL

## 2022-01-18 NOTE — PROGRESS NOTES
Cesilia Pedersen, a female of 57 y.o. came to the office 1/18/2022. Patient Active Problem List   Diagnosis    DCIS (ductal carcinoma in situ) of breast    Personal history of breast cancer    Primary hypertension    Pure hypercholesterolemia          Hyperlipidemia  This is a chronic problem. The current episode started more than 1 year ago. Pertinent negatives include no chest pain, leg pain, myalgias or shortness of breath. Current antihyperlipidemic treatment includes statins. Hypertension  This is a chronic problem. The problem is controlled. Pertinent negatives include no chest pain, headaches, palpitations, peripheral edema or shortness of breath. The current treatment provides moderate improvement. cough: usually in am when awakens. Improved with taking ppi bid. Allergies   Allergen Reactions    Elemental Sulfur Anaphylaxis and Other (See Comments)     Sores in mouth, throat closed    Doxycycline     Sulfa Antibiotics        Current Outpatient Medications on File Prior to Visit   Medication Sig Dispense Refill    atorvastatin (LIPITOR) 20 MG tablet Take 1 tablet by mouth daily 30 tablet 5    hydroCHLOROthiazide (HYDRODIURIL) 25 MG tablet Take 1 tablet by mouth daily 30 tablet 5    sertraline (ZOLOFT) 100 MG tablet Take 1 tablet by mouth daily 30 tablet 5    SYMBICORT 80-4.5 MCG/ACT AERO INHALE 2 PUFFS INTO THE LUNGS TWICE DAILY 10.2 g 3    omeprazole (PRILOSEC) 20 MG delayed release capsule Take 1 capsule by mouth daily OTC Take am dos 03/01 30 capsule 0    albuterol-ipratropium (COMBIVENT RESPIMAT)  MCG/ACT AERS inhaler Inhale 1 puff into the lungs every 6 hours 1 Inhaler 0    Cholecalciferol (VITAMIN D3) 2000 UNITS CAPS Take 2,000 Units by mouth daily       No current facility-administered medications on file prior to visit. Review of Systems   Respiratory: Positive for cough. Negative for shortness of breath.     Cardiovascular: Negative for chest pain, palpitations and leg swelling. Gastrointestinal: Negative for blood in stool, constipation and diarrhea. Musculoskeletal: Negative for myalgias. Neurological: Negative for headaches. other review of systems reviewed and are negative    OBJECTIVE:  /80   Pulse 88   Temp 98 °F (36.7 °C)   Wt 155 lb (70.3 kg)   SpO2 96%   BMI 28.35 kg/m²      Physical Exam  Vitals reviewed. Eyes:      General: No scleral icterus. Conjunctiva/sclera: Conjunctivae normal.   Neck:      Thyroid: No thyromegaly. Vascular: No carotid bruit. Cardiovascular:      Rate and Rhythm: Normal rate and regular rhythm. Heart sounds: No murmur heard. Pulmonary:      Effort: Pulmonary effort is normal.      Breath sounds: Normal breath sounds. No wheezing or rales. Abdominal:      General: Bowel sounds are normal.      Palpations: Abdomen is soft. There is no mass. Tenderness: There is no abdominal tenderness. There is no guarding or rebound. Musculoskeletal:         General: Normal range of motion. Cervical back: Neck supple. Lymphadenopathy:      Cervical: No cervical adenopathy. Skin:     General: Skin is warm and dry. Neurological:      Mental Status: She is alert and oriented to person, place, and time. Psychiatric:         Mood and Affect: Mood normal.         ASSESSMENT AND PLAN:    Olivia Schultz was seen today for check-up, 6 month follow-up and hyperlipidemia. Diagnoses and all orders for this visit:    Primary hypertension  -     Comprehensive Metabolic Panel; Future    Pure hypercholesterolemia  -     Lipid Panel; Future    Chronic cough  -     cetirizine (ZYRTEC) 10 MG tablet; Take 1 tablet by mouth daily    - bp stable continue med  - low chol diet   - if Zyrtec helps call for Rx. Return in about 6 months (around 7/18/2022) for or for acute problem.     Justa Mcdaniels,

## 2022-01-26 DIAGNOSIS — I10 PRIMARY HYPERTENSION: ICD-10-CM

## 2022-01-26 DIAGNOSIS — E78.00 PURE HYPERCHOLESTEROLEMIA: ICD-10-CM

## 2022-01-26 LAB
ALBUMIN SERPL-MCNC: 4 G/DL (ref 3.5–5.2)
ALP BLD-CCNC: 73 U/L (ref 35–104)
ALT SERPL-CCNC: 21 U/L (ref 0–32)
ANION GAP SERPL CALCULATED.3IONS-SCNC: 11 MMOL/L (ref 7–16)
AST SERPL-CCNC: 19 U/L (ref 0–31)
BILIRUB SERPL-MCNC: 0.4 MG/DL (ref 0–1.2)
BUN BLDV-MCNC: 17 MG/DL (ref 6–23)
CALCIUM SERPL-MCNC: 9.9 MG/DL (ref 8.6–10.2)
CHLORIDE BLD-SCNC: 101 MMOL/L (ref 98–107)
CHOLESTEROL, TOTAL: 184 MG/DL (ref 0–199)
CO2: 28 MMOL/L (ref 22–29)
CREAT SERPL-MCNC: 0.9 MG/DL (ref 0.5–1)
GFR AFRICAN AMERICAN: >60
GFR NON-AFRICAN AMERICAN: >60 ML/MIN/1.73
GLUCOSE BLD-MCNC: 90 MG/DL (ref 74–99)
HDLC SERPL-MCNC: 64 MG/DL
LDL CHOLESTEROL CALCULATED: 109 MG/DL (ref 0–99)
POTASSIUM SERPL-SCNC: 3.9 MMOL/L (ref 3.5–5)
SODIUM BLD-SCNC: 140 MMOL/L (ref 132–146)
TOTAL PROTEIN: 7.3 G/DL (ref 6.4–8.3)
TRIGL SERPL-MCNC: 54 MG/DL (ref 0–149)
VLDLC SERPL CALC-MCNC: 11 MG/DL

## 2022-03-21 RX ORDER — HYDROCHLOROTHIAZIDE 25 MG/1
25 TABLET ORAL DAILY
Qty: 30 TABLET | Refills: 5 | Status: ON HOLD
Start: 2022-03-21 | End: 2022-09-12

## 2022-03-21 RX ORDER — ATORVASTATIN CALCIUM 20 MG/1
20 TABLET, FILM COATED ORAL DAILY
Qty: 30 TABLET | Refills: 5 | Status: SHIPPED
Start: 2022-03-21 | End: 2022-09-11

## 2022-03-21 RX ORDER — SERTRALINE HYDROCHLORIDE 100 MG/1
100 TABLET, FILM COATED ORAL DAILY
Qty: 30 TABLET | Refills: 5 | Status: SHIPPED
Start: 2022-03-21 | End: 2022-05-19 | Stop reason: SDUPTHER

## 2022-04-01 ENCOUNTER — PATIENT MESSAGE (OUTPATIENT)
Dept: PRIMARY CARE CLINIC | Age: 62
End: 2022-04-01

## 2022-04-01 NOTE — TELEPHONE ENCOUNTER
From: Derrell Flynn  To: Dr. Juan Babin: 4/1/2022 9:37 AM EDT  Subject: Coughing, wheezing    Good morning Dr. Phylis Klinefelter,  Lately my coughing and wheezing has gotten more intense. mucus is always yellow/sometimes clear white, feel winded at times trying to catch breath. still using the Symbicort twice a day in the morning and evening. Hoping to get some comfort level and sleep from the wheezing, throat noises and persistent cough. Thank you for your time.    Kaela

## 2022-04-02 RX ORDER — ALBUTEROL SULFATE 90 UG/1
2 AEROSOL, METERED RESPIRATORY (INHALATION) EVERY 6 HOURS PRN
Qty: 18 G | Refills: 0 | Status: SHIPPED
Start: 2022-04-02 | End: 2022-04-28

## 2022-04-26 ENCOUNTER — OFFICE VISIT (OUTPATIENT)
Dept: PRIMARY CARE CLINIC | Age: 62
End: 2022-04-26
Payer: COMMERCIAL

## 2022-04-26 VITALS
HEART RATE: 78 BPM | SYSTOLIC BLOOD PRESSURE: 130 MMHG | HEIGHT: 62 IN | BODY MASS INDEX: 28.71 KG/M2 | DIASTOLIC BLOOD PRESSURE: 76 MMHG | TEMPERATURE: 96.4 F | OXYGEN SATURATION: 94 % | WEIGHT: 156 LBS

## 2022-04-26 DIAGNOSIS — J40 BRONCHITIS: ICD-10-CM

## 2022-04-26 DIAGNOSIS — R05.3 CHRONIC COUGH: Primary | ICD-10-CM

## 2022-04-26 PROCEDURE — G8427 DOCREV CUR MEDS BY ELIG CLIN: HCPCS | Performed by: FAMILY MEDICINE

## 2022-04-26 PROCEDURE — 3017F COLORECTAL CA SCREEN DOC REV: CPT | Performed by: FAMILY MEDICINE

## 2022-04-26 PROCEDURE — 99213 OFFICE O/P EST LOW 20 MIN: CPT | Performed by: FAMILY MEDICINE

## 2022-04-26 PROCEDURE — G8419 CALC BMI OUT NRM PARAM NOF/U: HCPCS | Performed by: FAMILY MEDICINE

## 2022-04-26 PROCEDURE — 1036F TOBACCO NON-USER: CPT | Performed by: FAMILY MEDICINE

## 2022-04-26 RX ORDER — LEVOFLOXACIN 500 MG/1
500 TABLET, FILM COATED ORAL DAILY
Qty: 10 TABLET | Refills: 0 | Status: SHIPPED | OUTPATIENT
Start: 2022-04-26 | End: 2022-05-06

## 2022-04-26 ASSESSMENT — ENCOUNTER SYMPTOMS
SINUS PAIN: 0
SORE THROAT: 0
SINUS PRESSURE: 0
RHINORRHEA: 0
SHORTNESS OF BREATH: 1
WHEEZING: 1
COUGH: 1

## 2022-04-26 NOTE — PROGRESS NOTES
Shelbie Mccollum, a female of 57 y.o. came to the office 4/26/2022. Patient Active Problem List   Diagnosis    DCIS (ductal carcinoma in situ) of breast    Personal history of breast cancer    Primary hypertension    Pure hypercholesterolemia          Cough  This is a chronic problem. The current episode started more than 1 month ago. The problem has been unchanged. The cough is productive of purulent sputum (chuncky and sticky at times. ). Associated symptoms include shortness of breath and wheezing. Pertinent negatives include no chest pain, chills, ear pain, fever, headaches, postnasal drip, rhinorrhea or sore throat. She has tried a beta-agonist inhaler and steroid inhaler (was needing qd after ordered several wks for 2 wks straight. was using q 6hrs then weaned down on to once a day now. ) for the symptoms. Allergies   Allergen Reactions    Elemental Sulfur Anaphylaxis and Other (See Comments)     Sores in mouth, throat closed    Doxycycline     Sulfa Antibiotics        Current Outpatient Medications on File Prior to Visit   Medication Sig Dispense Refill    albuterol sulfate  (90 Base) MCG/ACT inhaler Inhale 2 puffs into the lungs every 6 hours as needed for Wheezing (Patient taking differently: Inhale 2 puffs into the lungs daily ) 18 g 0    atorvastatin (LIPITOR) 20 MG tablet TAKE 1 TABLET BY MOUTH DAILY 30 tablet 5    hydroCHLOROthiazide (HYDRODIURIL) 25 MG tablet TAKE 1 TABLET BY MOUTH DAILY 30 tablet 5    sertraline (ZOLOFT) 100 MG tablet TAKE 1 TABLET BY MOUTH DAILY 30 tablet 5    cetirizine (ZYRTEC) 10 MG tablet Take 1 tablet by mouth daily 6 tablet 0    SYMBICORT 80-4.5 MCG/ACT AERO INHALE 2 PUFFS INTO THE LUNGS TWICE DAILY 10.2 g 3    Cholecalciferol (VITAMIN D3) 2000 UNITS CAPS Take 2,000 Units by mouth daily       No current facility-administered medications on file prior to visit. Review of Systems   Constitutional: Negative for chills and fever.    HENT: Negative for congestion, ear pain, postnasal drip, rhinorrhea, sinus pressure, sinus pain and sore throat. Respiratory: Positive for cough, shortness of breath and wheezing. Cardiovascular: Negative for chest pain, palpitations and leg swelling. Neurological: Negative for headaches. other review of systems reviewed and are negative    OBJECTIVE:  /76   Pulse 78   Temp 96.4 °F (35.8 °C)   Ht 5' 2\" (1.575 m)   Wt 156 lb (70.8 kg)   SpO2 94%   BMI 28.53 kg/m²      Physical Exam  Constitutional:       General: She is not in acute distress. HENT:      Right Ear: Tympanic membrane normal.      Left Ear: Tympanic membrane normal.      Nose: No mucosal edema or rhinorrhea. Right Sinus: No maxillary sinus tenderness or frontal sinus tenderness. Left Sinus: No maxillary sinus tenderness or frontal sinus tenderness. Mouth/Throat:      Pharynx: Uvula midline. No oropharyngeal exudate or posterior oropharyngeal erythema. Cardiovascular:      Rate and Rhythm: Normal rate and regular rhythm. Pulmonary:      Effort: Pulmonary effort is normal.      Breath sounds: Examination of the left-upper field reveals wheezing. Examination of the left-middle field reveals wheezing. Examination of the left-lower field reveals wheezing. Wheezing (with inspir) present. Musculoskeletal:      Cervical back: Neck supple. Lymphadenopathy:      Cervical: No cervical adenopathy. ASSESSMENT AND PLAN:    Venessa  was seen today for cough. Diagnoses and all orders for this visit:    Chronic cough  -     CT CHEST WO CONTRAST; Future    Bronchitis  -     levoFLOXacin (LEVAQUIN) 500 MG tablet; Take 1 tablet by mouth daily for 10 days  -     CT CHEST WO CONTRAST; Future    - continue Symbicort bid and use Albuterol prn.   - consider Singulair if no change after Levaquin. Return for based on results.     Radha Mcdaniels, DO

## 2022-04-28 RX ORDER — ALBUTEROL SULFATE 90 UG/1
2 AEROSOL, METERED RESPIRATORY (INHALATION) EVERY 6 HOURS PRN
Qty: 6.7 G | Refills: 3 | Status: SHIPPED | OUTPATIENT
Start: 2022-04-28

## 2022-05-03 ENCOUNTER — HOSPITAL ENCOUNTER (OUTPATIENT)
Dept: CT IMAGING | Age: 62
Discharge: HOME | End: 2022-05-05
Payer: COMMERCIAL

## 2022-05-03 DIAGNOSIS — R05.3 CHRONIC COUGH: ICD-10-CM

## 2022-05-03 DIAGNOSIS — J40 BRONCHITIS: ICD-10-CM

## 2022-05-03 PROCEDURE — 71250 CT THORAX DX C-: CPT

## 2022-05-12 ENCOUNTER — TELEPHONE (OUTPATIENT)
Dept: BREAST CENTER | Age: 62
End: 2022-05-12

## 2022-05-12 NOTE — TELEPHONE ENCOUNTER
I spoke to Verónica BRADY today. She has a remote history of DCIS in the left breast in 2013 which was ER/PA negative. We reviewed that it is most likely the changes on her CAT scan are consistent with infectious etiology. We reviewed that she will need a repeat CT of the chest as already discussed by her primary care physician to ensure resolution of the infiltrate as well as monitoring of the pulmonary nodule in the left lower lobe. She will be seeing Dr. Hesham Gannon in June and will have her mammogram done at that time. No other complaints at this time. Review of CMP from 1/26/2022 reveals normal calcium and liver function studies. She was advised to continue follow-up with primary care as directed. She was advised should she decide she would like a follow-up appointment she can simply call our office and I would be happy to see her. Verbalized explanation as above and will contact our office in the event she would like a follow-up appointment. Briana Whipple RN, MSN, APRN-CNP, [address] [place]  Advanced Oncology Certified Nurse Practitioner  Department of Breast Surgery  [facility]/  [facility] in collaboration with Dr. Sarah Goyal. Thelma/Dr. Titus Teran/Dr. Mateo Palmer APRN-CNP        ----- Message from Sarbjit Mcdaniel RN sent at 5/12/2022  2:17 PM EDT -----  Johnathan Castillo,    This patient called asking if she needs to come back to see you after her recent abnormal CT chest which noted some new areas in her lungs (copied report below). She has a history of high grade DCIS in the left breast in early 2013 (ER/PA negative). She last saw you in 2019 (PRN follow up at that time). She would like to know if she should continue follow up with her family doctor or if you'd like to her see back concerning the new changes. PCP is treating her for a lung infection. She is on antibiotics for 10 days. He plans to do a 3 month follow up CT scan.  She reports an ongoing cough for a while. She wheezes quite a bit and is short of breath -- her PCP is aware of these symptoms and is following closely. She last saw medical oncology (Dr. Briseida Sutherland) here in 2014. She was recommended to follow up in a year. She didn't realize she had to and also was told Dr. Briseida Sutherland left the practice. Patient wanted to know your thoughts on any follow up with us with these new findings considering her history of breast cancer. CT CHEST WO CONTRAST-- Report:    FINDINGS:  Mediastinum: Thyroid is homogeneous in attenuation. No bulky mediastinal  adenopathy. Central airways are patent. Esophagus is normal course and  caliber to the distal segment where there is a small hiatal hernia.  Patent  nonaneurysmal thoracic aorta. Cardiac size within normal limits without  pericardial effusion.     Lungs/pleura: Fairly consolidative ground-glass opacifications in the mid and  upper lungs right slightly greater than left of involvement for example  series 3 image 65 through 67 concerning for airspace disease atypical viral  etiology is a consideration.  6 mm pleural based left lower lobe pleural  nodule with surrounding opacities.  No pleural effusion or pleural process.     Upper Abdomen: Visualized portions of the upper abdomen unremarkable.     Soft Tissues/Bones: No acute osseous or soft tissue findings. No aggressive  osseous lesion.        Impression  Ground-glass opacifications mid and upper lung predominant concerning for  atypical viral etiology of bronchopneumonia.  Minimal opacifications left  lower lung with pleural based 6 mm noncalcified pulmonary nodule left lower  lobe.  Recommendations below for nodular follow-up.  Airspace disease  follow-up to resolution recommended     RECOMMENDATIONS:  Multiple pulmonary nodules. Most severe: 6 mm left solid pulmonary nodule. Recommend a non-contrast Chest CT at 3-6 months.  If patient is high risk for  malignancy, recommend an additional non-contrast Chest CT at 18-24 months; if  patient is low risk for malignancy a non-contrast Chest CT at 18-24 months is  optional.  These guidelines do not apply to immunocompromised patients and patients with  cancer. Follow up in patients with significant comorbidities as clinically  warranted. For lung cancer screening, adhere to Lung-RADS guidelines. Reference: Radiology. 2017; 284(1):228-43.             Thank you,  Monet

## 2022-05-13 ENCOUNTER — PATIENT MESSAGE (OUTPATIENT)
Dept: PRIMARY CARE CLINIC | Age: 62
End: 2022-05-13

## 2022-05-13 DIAGNOSIS — J40 BRONCHITIS: ICD-10-CM

## 2022-05-13 DIAGNOSIS — R05.8 COUGH PRODUCTIVE OF PURULENT SPUTUM: ICD-10-CM

## 2022-05-13 RX ORDER — DILTIAZEM HYDROCHLORIDE 60 MG/1
TABLET, FILM COATED ORAL
Qty: 10.2 G | Refills: 2 | Status: SHIPPED
Start: 2022-05-13 | End: 2022-07-19 | Stop reason: DRUGHIGH

## 2022-05-13 RX ORDER — MONTELUKAST SODIUM 10 MG/1
10 TABLET ORAL NIGHTLY
Qty: 30 TABLET | Refills: 1 | Status: SHIPPED
Start: 2022-05-13 | End: 2022-06-22 | Stop reason: SDUPTHER

## 2022-05-13 NOTE — TELEPHONE ENCOUNTER
From: Dave Luis  To: Dr. Clarice Daniel: 5/13/2022 7:52 AM EDT  Subject: Levoflaxacin    Good morning Dr. Georgiana Schuster,  Just wanted to update you that i had a phone conversation w/NP Yinka Wheeler from [facility] only bc of prior breast cancer concerns and questions. I finished the Levoflaxacin last Thursday (5/8/2022)   my energy is still good but I feel systematic again with shortness of breath, cough, wheezing and yellow mucus. Have a good Friday!

## 2022-05-19 RX ORDER — SERTRALINE HYDROCHLORIDE 100 MG/1
TABLET, FILM COATED ORAL
Qty: 45 TABLET | Refills: 5 | Status: ON HOLD
Start: 2022-05-19 | End: 2022-09-14 | Stop reason: SDUPTHER

## 2022-06-22 RX ORDER — MONTELUKAST SODIUM 10 MG/1
10 TABLET ORAL NIGHTLY
Qty: 30 TABLET | Refills: 1 | Status: SHIPPED
Start: 2022-06-22 | End: 2022-08-14

## 2022-07-19 ENCOUNTER — OFFICE VISIT (OUTPATIENT)
Dept: PRIMARY CARE CLINIC | Age: 62
End: 2022-07-19
Payer: COMMERCIAL

## 2022-07-19 VITALS
SYSTOLIC BLOOD PRESSURE: 122 MMHG | DIASTOLIC BLOOD PRESSURE: 80 MMHG | HEART RATE: 89 BPM | WEIGHT: 156 LBS | TEMPERATURE: 97.2 F | BODY MASS INDEX: 28.53 KG/M2 | OXYGEN SATURATION: 95 %

## 2022-07-19 DIAGNOSIS — I10 PRIMARY HYPERTENSION: Primary | ICD-10-CM

## 2022-07-19 DIAGNOSIS — J45.901 ACUTE EXACERBATION OF EXTRINSIC ASTHMA: ICD-10-CM

## 2022-07-19 PROCEDURE — 1036F TOBACCO NON-USER: CPT | Performed by: FAMILY MEDICINE

## 2022-07-19 PROCEDURE — 99214 OFFICE O/P EST MOD 30 MIN: CPT | Performed by: FAMILY MEDICINE

## 2022-07-19 PROCEDURE — 3017F COLORECTAL CA SCREEN DOC REV: CPT | Performed by: FAMILY MEDICINE

## 2022-07-19 PROCEDURE — G8427 DOCREV CUR MEDS BY ELIG CLIN: HCPCS | Performed by: FAMILY MEDICINE

## 2022-07-19 PROCEDURE — G8419 CALC BMI OUT NRM PARAM NOF/U: HCPCS | Performed by: FAMILY MEDICINE

## 2022-07-19 RX ORDER — BUDESONIDE AND FORMOTEROL FUMARATE DIHYDRATE 160; 4.5 UG/1; UG/1
2 AEROSOL RESPIRATORY (INHALATION) 2 TIMES DAILY
Qty: 10.2 G | Refills: 3 | Status: SHIPPED | OUTPATIENT
Start: 2022-07-19

## 2022-07-19 RX ORDER — PREDNISONE 10 MG/1
TABLET ORAL
Qty: 21 TABLET | Refills: 0 | Status: SHIPPED
Start: 2022-07-19 | End: 2022-09-11

## 2022-07-19 ASSESSMENT — ENCOUNTER SYMPTOMS
COUGH: 1
CHEST TIGHTNESS: 0
SHORTNESS OF BREATH: 1
CHEST TIGHTNESS: 1
WHEEZING: 1

## 2022-07-19 ASSESSMENT — PATIENT HEALTH QUESTIONNAIRE - PHQ9
1. LITTLE INTEREST OR PLEASURE IN DOING THINGS: 0
SUM OF ALL RESPONSES TO PHQ QUESTIONS 1-9: 0
SUM OF ALL RESPONSES TO PHQ9 QUESTIONS 1 & 2: 0
SUM OF ALL RESPONSES TO PHQ QUESTIONS 1-9: 0
2. FEELING DOWN, DEPRESSED OR HOPELESS: 0

## 2022-07-19 NOTE — PROGRESS NOTES
Willie Ledesma, a female of 57 y.o. came to the office 7/19/2022. Patient Active Problem List   Diagnosis    DCIS (ductal carcinoma in situ) of breast    Personal history of breast cancer    Primary hypertension    Pure hypercholesterolemia          Hypertension  This is a chronic problem. The current episode started more than 1 year ago. The problem is controlled. Associated symptoms include shortness of breath. Pertinent negatives include no chest pain, headaches or palpitations. Asthma  She complains of chest tightness, cough, shortness of breath and wheezing. This is a chronic problem. The current episode started more than 1 year ago. The cough is dry. Pertinent negatives include no chest pain, fever or headaches. Her symptoms are aggravated by change in weather. Her symptoms are alleviated by steroid inhaler and beta-agonist (using albuterol bid). Her past medical history is significant for asthma. Allergies   Allergen Reactions    Elemental Sulfur Anaphylaxis and Other (See Comments)     Sores in mouth, throat closed    Doxycycline     Sulfa Antibiotics        Current Outpatient Medications on File Prior to Visit   Medication Sig Dispense Refill    montelukast (SINGULAIR) 10 MG tablet Take 1 tablet by mouth nightly 30 tablet 1    sertraline (ZOLOFT) 100 MG tablet Take one and a half tablets daily 45 tablet 5    albuterol sulfate  (90 Base) MCG/ACT inhaler INHALE 2 PUFFS INTO THE LUNGS EVERY 6 HOURS AS NEEDED FOR WHEEZING 6.7 g 3    atorvastatin (LIPITOR) 20 MG tablet TAKE 1 TABLET BY MOUTH DAILY 30 tablet 5    hydroCHLOROthiazide (HYDRODIURIL) 25 MG tablet TAKE 1 TABLET BY MOUTH DAILY 30 tablet 5    cetirizine (ZYRTEC) 10 MG tablet Take 1 tablet by mouth daily 6 tablet 0    Cholecalciferol (VITAMIN D3) 2000 UNITS CAPS Take 2,000 Units by mouth daily       No current facility-administered medications on file prior to visit.        Review of Systems   Constitutional:  Negative for chills and fever.   Respiratory:  Positive for cough, shortness of breath and wheezing. Negative for chest tightness. Cardiovascular:  Negative for chest pain, palpitations and leg swelling. Neurological:  Negative for headaches. other review of systems reviewed and are negative    OBJECTIVE:  /80   Pulse 89   Temp 97.2 °F (36.2 °C)   Wt 156 lb (70.8 kg)   SpO2 95%   BMI 28.53 kg/m²      Physical Exam  Vitals reviewed. Eyes:      General: No scleral icterus. Conjunctiva/sclera: Conjunctivae normal.   Neck:      Thyroid: No thyromegaly. Vascular: No carotid bruit. Cardiovascular:      Rate and Rhythm: Normal rate and regular rhythm. Heart sounds: No murmur heard. Pulmonary:      Effort: Pulmonary effort is normal.      Breath sounds: Wheezing (with inspir and expir with dec airflow) present. No rales. Musculoskeletal:         General: Normal range of motion. Cervical back: Neck supple. Lymphadenopathy:      Cervical: No cervical adenopathy. Skin:     General: Skin is warm and dry. Neurological:      Mental Status: She is alert and oriented to person, place, and time. ASSESSMENT AND PLAN:    Sandy Díaz was seen today for hypertension and asthma. Diagnoses and all orders for this visit:    Primary hypertension    Acute exacerbation of extrinsic asthma  -     budesonide-formoterol (SYMBICORT) 160-4.5 MCG/ACT AERO; Inhale 2 puffs into the lungs in the morning and 2 puffs before bedtime. -     predniSONE (DELTASONE) 10 MG tablet; 4 daily for 2 days, then 3 daily for 2 days, then 2 daily for 2 days, then 1 daily for 2 days, then 1/2 daily for 2days. - bp stable so continue current doses  - increase strength of Symbicort   - to ER if worsens. Return in about 1 week (around 7/26/2022).     Renato Mcdaniels, DO

## 2022-07-26 ENCOUNTER — OFFICE VISIT (OUTPATIENT)
Dept: PRIMARY CARE CLINIC | Age: 62
End: 2022-07-26
Payer: COMMERCIAL

## 2022-07-26 VITALS
DIASTOLIC BLOOD PRESSURE: 64 MMHG | TEMPERATURE: 97.5 F | WEIGHT: 154 LBS | HEART RATE: 76 BPM | BODY MASS INDEX: 28.17 KG/M2 | OXYGEN SATURATION: 95 % | SYSTOLIC BLOOD PRESSURE: 122 MMHG

## 2022-07-26 DIAGNOSIS — R91.8 PULMONARY NODULES: ICD-10-CM

## 2022-07-26 DIAGNOSIS — J45.901 ACUTE EXACERBATION OF EXTRINSIC ASTHMA: Primary | ICD-10-CM

## 2022-07-26 PROCEDURE — G8419 CALC BMI OUT NRM PARAM NOF/U: HCPCS | Performed by: FAMILY MEDICINE

## 2022-07-26 PROCEDURE — 99213 OFFICE O/P EST LOW 20 MIN: CPT | Performed by: FAMILY MEDICINE

## 2022-07-26 PROCEDURE — 1036F TOBACCO NON-USER: CPT | Performed by: FAMILY MEDICINE

## 2022-07-26 PROCEDURE — 3017F COLORECTAL CA SCREEN DOC REV: CPT | Performed by: FAMILY MEDICINE

## 2022-07-26 PROCEDURE — G8427 DOCREV CUR MEDS BY ELIG CLIN: HCPCS | Performed by: FAMILY MEDICINE

## 2022-07-26 ASSESSMENT — ENCOUNTER SYMPTOMS
SHORTNESS OF BREATH: 0
COUGH: 0
WHEEZING: 0

## 2022-07-26 NOTE — PROGRESS NOTES
Ray Bedoya, a female of 57 y.o. came to the office 7/26/2022. Patient Active Problem List   Diagnosis    DCIS (ductal carcinoma in situ) of breast    Personal history of breast cancer    Primary hypertension    Pure hypercholesterolemia          Asthma  There is no cough, shortness of breath or wheezing. Pertinent negatives include no fever. Her past medical history is significant for asthma. asthma exac: doing much better since Prednisone started, has 2 days left. No issues with inc dose of Symbicort. Allergies   Allergen Reactions    Elemental Sulfur Anaphylaxis and Other (See Comments)     Sores in mouth, throat closed    Doxycycline     Sulfa Antibiotics        Current Outpatient Medications on File Prior to Visit   Medication Sig Dispense Refill    budesonide-formoterol (SYMBICORT) 160-4.5 MCG/ACT AERO Inhale 2 puffs into the lungs in the morning and 2 puffs before bedtime. 10.2 g 3    predniSONE (DELTASONE) 10 MG tablet 4 daily for 2 days, then 3 daily for 2 days, then 2 daily for 2 days, then 1 daily for 2 days, then 1/2 daily for 2days. 21 tablet 0    montelukast (SINGULAIR) 10 MG tablet Take 1 tablet by mouth nightly 30 tablet 1    sertraline (ZOLOFT) 100 MG tablet Take one and a half tablets daily 45 tablet 5    albuterol sulfate  (90 Base) MCG/ACT inhaler INHALE 2 PUFFS INTO THE LUNGS EVERY 6 HOURS AS NEEDED FOR WHEEZING 6.7 g 3    atorvastatin (LIPITOR) 20 MG tablet TAKE 1 TABLET BY MOUTH DAILY 30 tablet 5    hydroCHLOROthiazide (HYDRODIURIL) 25 MG tablet TAKE 1 TABLET BY MOUTH DAILY 30 tablet 5    cetirizine (ZYRTEC) 10 MG tablet Take 1 tablet by mouth daily 6 tablet 0    Cholecalciferol (VITAMIN D3) 2000 UNITS CAPS Take 2,000 Units by mouth daily       No current facility-administered medications on file prior to visit. Review of Systems   Constitutional:  Negative for chills, fatigue and fever. Energy much improved.    Respiratory:  Negative for cough, shortness of breath and wheezing. other review of systems reviewed and are negative    OBJECTIVE:  /64   Pulse 76   Temp 97.5 °F (36.4 °C)   Wt 154 lb (69.9 kg)   SpO2 95%   BMI 28.17 kg/m²      Physical Exam  Vitals reviewed. Eyes:      General: No scleral icterus. Conjunctiva/sclera: Conjunctivae normal.   Neck:      Thyroid: No thyromegaly. Cardiovascular:      Rate and Rhythm: Normal rate and regular rhythm. Heart sounds: No murmur heard. Pulmonary:      Effort: Pulmonary effort is normal.      Breath sounds: Normal breath sounds. No wheezing or rales. Musculoskeletal:         General: Normal range of motion. Cervical back: Neck supple. Lymphadenopathy:      Cervical: No cervical adenopathy. Skin:     General: Skin is warm and dry. Neurological:      Mental Status: She is alert and oriented to person, place, and time. ASSESSMENT AND PLAN:    Skyla Sky was seen today for asthma. Diagnoses and all orders for this visit:    Acute exacerbation of extrinsic asthma    Pulmonary nodules  - finish up Prednisone Rx.  - continue Symbicort at new dose.   - get non contrast CT lung in 2 months. Return in about 6 months (around 1/26/2023), or if symptoms worsen or fail to improve.     Taco Mcdaniels, DO

## 2022-08-14 RX ORDER — MONTELUKAST SODIUM 10 MG/1
TABLET ORAL
Qty: 30 TABLET | Refills: 1 | Status: SHIPPED
Start: 2022-08-14 | End: 2022-09-11

## 2022-08-23 ENCOUNTER — HOSPITAL ENCOUNTER (OUTPATIENT)
Dept: GENERAL RADIOLOGY | Age: 62
Discharge: HOME | End: 2022-08-25
Payer: COMMERCIAL

## 2022-08-23 VITALS — HEIGHT: 62 IN | WEIGHT: 167 LBS | BODY MASS INDEX: 30.73 KG/M2

## 2022-08-23 DIAGNOSIS — Z12.31 VISIT FOR SCREENING MAMMOGRAM: ICD-10-CM

## 2022-08-23 PROCEDURE — 77063 BREAST TOMOSYNTHESIS BI: CPT

## 2022-09-11 ENCOUNTER — APPOINTMENT (OUTPATIENT)
Dept: CT IMAGING | Age: 62
End: 2022-09-11
Payer: COMMERCIAL

## 2022-09-11 ENCOUNTER — HOSPITAL ENCOUNTER (EMERGENCY)
Age: 62
Discharge: ANOTHER HOSPITAL | End: 2022-09-12
Attending: EMERGENCY MEDICINE
Payer: COMMERCIAL

## 2022-09-11 ENCOUNTER — APPOINTMENT (OUTPATIENT)
Dept: GENERAL RADIOLOGY | Age: 62
End: 2022-09-11
Payer: COMMERCIAL

## 2022-09-11 VITALS
OXYGEN SATURATION: 96 % | BODY MASS INDEX: 28.34 KG/M2 | HEIGHT: 62 IN | RESPIRATION RATE: 18 BRPM | WEIGHT: 154 LBS | TEMPERATURE: 99.2 F | HEART RATE: 75 BPM | DIASTOLIC BLOOD PRESSURE: 71 MMHG | SYSTOLIC BLOOD PRESSURE: 130 MMHG

## 2022-09-11 DIAGNOSIS — J18.9 PNEUMONIA OF BOTH LUNGS DUE TO INFECTIOUS ORGANISM, UNSPECIFIED PART OF LUNG: ICD-10-CM

## 2022-09-11 DIAGNOSIS — J96.01 ACUTE RESPIRATORY FAILURE WITH HYPOXIA (HCC): Primary | ICD-10-CM

## 2022-09-11 LAB
ALBUMIN SERPL-MCNC: 4.2 G/DL (ref 3.5–5.2)
ALP BLD-CCNC: 82 U/L (ref 35–104)
ALT SERPL-CCNC: 12 U/L (ref 0–32)
ANION GAP SERPL CALCULATED.3IONS-SCNC: 13 MMOL/L (ref 7–16)
AST SERPL-CCNC: 12 U/L (ref 0–31)
BASOPHILS ABSOLUTE: 0.11 E9/L (ref 0–0.2)
BASOPHILS RELATIVE PERCENT: 0.9 % (ref 0–2)
BILIRUB SERPL-MCNC: 0.5 MG/DL (ref 0–1.2)
BUN BLDV-MCNC: 14 MG/DL (ref 6–23)
CALCIUM SERPL-MCNC: 10.3 MG/DL (ref 8.6–10.2)
CHLORIDE BLD-SCNC: 98 MMOL/L (ref 98–107)
CO2: 30 MMOL/L (ref 22–29)
CREAT SERPL-MCNC: 0.7 MG/DL (ref 0.5–1)
EOSINOPHILS ABSOLUTE: 0.64 E9/L (ref 0.05–0.5)
EOSINOPHILS RELATIVE PERCENT: 5.3 % (ref 0–6)
GFR AFRICAN AMERICAN: >60
GFR NON-AFRICAN AMERICAN: >60 ML/MIN/1.73
GLUCOSE BLD-MCNC: 116 MG/DL (ref 74–99)
HCT VFR BLD CALC: 43.7 % (ref 34–48)
HEMOGLOBIN: 14.6 G/DL (ref 11.5–15.5)
IMMATURE GRANULOCYTES #: 0.05 E9/L
IMMATURE GRANULOCYTES %: 0.4 % (ref 0–5)
LYMPHOCYTES ABSOLUTE: 2.19 E9/L (ref 1.5–4)
LYMPHOCYTES RELATIVE PERCENT: 18.1 % (ref 20–42)
MAGNESIUM: 2 MG/DL (ref 1.6–2.6)
MCH RBC QN AUTO: 28.1 PG (ref 26–35)
MCHC RBC AUTO-ENTMCNC: 33.4 % (ref 32–34.5)
MCV RBC AUTO: 84.2 FL (ref 80–99.9)
MONOCYTES ABSOLUTE: 1.13 E9/L (ref 0.1–0.95)
MONOCYTES RELATIVE PERCENT: 9.4 % (ref 2–12)
NEUTROPHILS ABSOLUTE: 7.96 E9/L (ref 1.8–7.3)
NEUTROPHILS RELATIVE PERCENT: 65.9 % (ref 43–80)
PDW BLD-RTO: 13.5 FL (ref 11.5–15)
PLATELET # BLD: 337 E9/L (ref 130–450)
PMV BLD AUTO: 8.7 FL (ref 7–12)
POTASSIUM REFLEX MAGNESIUM: 3.5 MMOL/L (ref 3.5–5)
PRO-BNP: 125 PG/ML (ref 0–125)
RBC # BLD: 5.19 E12/L (ref 3.5–5.5)
SARS-COV-2, NAAT: NOT DETECTED
SODIUM BLD-SCNC: 141 MMOL/L (ref 132–146)
TOTAL PROTEIN: 7.7 G/DL (ref 6.4–8.3)
TROPONIN, HIGH SENSITIVITY: 7 NG/L (ref 0–9)
WBC # BLD: 12.1 E9/L (ref 4.5–11.5)

## 2022-09-11 PROCEDURE — 83735 ASSAY OF MAGNESIUM: CPT

## 2022-09-11 PROCEDURE — 87040 BLOOD CULTURE FOR BACTERIA: CPT

## 2022-09-11 PROCEDURE — 36415 COLL VENOUS BLD VENIPUNCTURE: CPT

## 2022-09-11 PROCEDURE — 6360000004 HC RX CONTRAST MEDICATION: Performed by: RADIOLOGY

## 2022-09-11 PROCEDURE — 6370000000 HC RX 637 (ALT 250 FOR IP): Performed by: EMERGENCY MEDICINE

## 2022-09-11 PROCEDURE — 85025 COMPLETE CBC W/AUTO DIFF WBC: CPT

## 2022-09-11 PROCEDURE — 96375 TX/PRO/DX INJ NEW DRUG ADDON: CPT

## 2022-09-11 PROCEDURE — 80053 COMPREHEN METABOLIC PANEL: CPT

## 2022-09-11 PROCEDURE — 87635 SARS-COV-2 COVID-19 AMP PRB: CPT

## 2022-09-11 PROCEDURE — 93005 ELECTROCARDIOGRAM TRACING: CPT | Performed by: EMERGENCY MEDICINE

## 2022-09-11 PROCEDURE — 71275 CT ANGIOGRAPHY CHEST: CPT

## 2022-09-11 PROCEDURE — 83880 ASSAY OF NATRIURETIC PEPTIDE: CPT

## 2022-09-11 PROCEDURE — 71046 X-RAY EXAM CHEST 2 VIEWS: CPT

## 2022-09-11 PROCEDURE — 84484 ASSAY OF TROPONIN QUANT: CPT

## 2022-09-11 PROCEDURE — 99285 EMERGENCY DEPT VISIT HI MDM: CPT

## 2022-09-11 PROCEDURE — 96365 THER/PROPH/DIAG IV INF INIT: CPT

## 2022-09-11 PROCEDURE — 6360000002 HC RX W HCPCS: Performed by: EMERGENCY MEDICINE

## 2022-09-11 PROCEDURE — 2580000003 HC RX 258: Performed by: EMERGENCY MEDICINE

## 2022-09-11 RX ORDER — METHYLPREDNISOLONE SODIUM SUCCINATE 125 MG/2ML
125 INJECTION, POWDER, LYOPHILIZED, FOR SOLUTION INTRAMUSCULAR; INTRAVENOUS ONCE
Status: COMPLETED | OUTPATIENT
Start: 2022-09-11 | End: 2022-09-11

## 2022-09-11 RX ORDER — IPRATROPIUM BROMIDE AND ALBUTEROL SULFATE 2.5; .5 MG/3ML; MG/3ML
1 SOLUTION RESPIRATORY (INHALATION) ONCE
Status: COMPLETED | OUTPATIENT
Start: 2022-09-11 | End: 2022-09-11

## 2022-09-11 RX ADMIN — METHYLPREDNISOLONE SODIUM SUCCINATE 125 MG: 125 INJECTION, POWDER, FOR SOLUTION INTRAMUSCULAR; INTRAVENOUS at 19:23

## 2022-09-11 RX ADMIN — AZITHROMYCIN MONOHYDRATE 500 MG: 500 INJECTION, POWDER, LYOPHILIZED, FOR SOLUTION INTRAVENOUS at 22:47

## 2022-09-11 RX ADMIN — IPRATROPIUM BROMIDE AND ALBUTEROL SULFATE 1 AMPULE: 2.5; .5 SOLUTION RESPIRATORY (INHALATION) at 20:10

## 2022-09-11 RX ADMIN — IPRATROPIUM BROMIDE AND ALBUTEROL SULFATE 1 AMPULE: 2.5; .5 SOLUTION RESPIRATORY (INHALATION) at 19:24

## 2022-09-11 RX ADMIN — CEFTRIAXONE 1000 MG: 1 INJECTION, POWDER, FOR SOLUTION INTRAMUSCULAR; INTRAVENOUS at 22:31

## 2022-09-11 RX ADMIN — IOPAMIDOL 75 ML: 755 INJECTION, SOLUTION INTRAVENOUS at 20:37

## 2022-09-11 ASSESSMENT — PAIN - FUNCTIONAL ASSESSMENT
PAIN_FUNCTIONAL_ASSESSMENT: NONE - DENIES PAIN

## 2022-09-11 NOTE — ED PROVIDER NOTES
HPI:  9/11/22, Time: 6:49 PM EDT         Mackenzie Minaya is a 57 y.o. female presenting to the ED for shortness of breath, beginning 2 weeks ago. The complaint has been persistent, mild in severity, and worsened by nothing. Patient says that for the last 2 weeks she has been having some mild shortness of breath. Its been getting progressively worse. She is now finding herself talking in one-word sentences. She has been using her albuterol inhaler every 4 hours and does not feel that it is helping. She says that she has been having a productive cough but denies any fever or chills. She did test herself for COVID and it was negative. Patient does not smoke. She has a history of asthma. She denies any recent antibiotics or steroids. ROS:   Pertinent positives and negatives are stated within HPI, all other systems reviewed and are negative.  --------------------------------------------- PAST HISTORY ---------------------------------------------  Past Medical History:  has a past medical history of Asthma, Breast cancer ([facility] Cancer ([facility] Endometriosis, GERD (gastroesophageal reflux disease), History of therapeutic radiation, Hx of Doppler ultrasound, Hyperlipidemia, Hypertension, Low vitamin D level, Osteopenia, Reactive airway disease, and Vision disturbance. Past Surgical History:  has a past surgical history that includes Tonsillectomy; Breast biopsy (Left, 01/14/2013); Breast surgery (Left, 02/27/2013); Upper gastrointestinal endoscopy (2005); Colonoscopy (2010); Colonoscopy (03/01/2017); Upper gastrointestinal endoscopy (04/15/2005); eye surgery; laparoscopy (1991); and Breast lumpectomy. Social History:  reports that she has never smoked. She has never used smokeless tobacco. She reports current alcohol use. She reports that she does not use drugs.     Family History: family history includes Arthritis in her maternal grandmother, paternal aunt, and paternal grandmother; Cancer in her paternal grandmother; Diabetes in her paternal aunt and paternal grandmother; Heart Disease in her father, paternal aunt, paternal grandmother, and paternal uncle; High Blood Pressure in her maternal aunt, mother, paternal aunt, paternal grandmother, paternal uncle, and sister; High Cholesterol in her father, paternal aunt, paternal grandmother, and sister; Kidney Disease in her paternal grandfather; Stroke in her paternal aunt. The patients home medications have been reviewed. Allergies: Elemental sulfur, Doxycycline, and Sulfa antibiotics    ---------------------------------------------------PHYSICAL   Constitutional/General: Alert and oriented x3, well appearing, non toxic in NAD  Head: Normocephalic and atraumatic  Eyes: PERRL, EOMI  Mouth: Oropharynx clear, handling secretions, no trismus  Neck: Supple, full ROM, non tender to palpation in the midline, no stridor, no crepitus, no meningeal signs  Pulmonary: Not in respiratory distress. Diffuse wheezing appreciated. Cardiovascular:  Regular rate. Regular rhythm. No murmurs, gallops, or rubs. 2+ distal pulses  Chest: no chest wall tenderness  Abdomen: Soft. Non tender. Non distended. +BS. No rebound, guarding, or rigidity. No pulsatile masses appreciated. Musculoskeletal: Moves all extremities x 4. Warm and well perfused, no clubbing, cyanosis, or edema. Capillary refill <3 seconds  Skin: warm and dry. No rashes. Neurologic: GCS 15, CN 2-12 grossly intact, no focal deficits, symmetric strength 5/5 in the upper and lower extremities bilaterally  Psych: Normal Affect    -------------------------------------------------- RESULTS -------------------------------------------------  I have personally reviewed all laboratory and imaging results for this patient. Results are listed below.      LABS:  Results for orders placed or performed during the hospital encounter of 09/11/22   COVID-19, Rapid    Specimen: Nasopharyngeal Swab   Result Value Ref Range SARS-CoV-2, NAAT Not Detected Not Detected   CBC with Auto Differential   Result Value Ref Range    WBC 12.1 (H) 4.5 - 11.5 E9/L    RBC 5.19 3.50 - 5.50 E12/L    Hemoglobin 14.6 11.5 - 15.5 g/dL    Hematocrit 43.7 34.0 - 48.0 %    MCV 84.2 80.0 - 99.9 fL    MCH 28.1 26.0 - 35.0 pg    MCHC 33.4 32.0 - 34.5 %    RDW 13.5 11.5 - 15.0 fL    Platelets 532 720 - 045 E9/L    MPV 8.7 7.0 - 12.0 fL    Neutrophils % 65.9 43.0 - 80.0 %    Immature Granulocytes % 0.4 0.0 - 5.0 %    Lymphocytes % 18.1 (L) 20.0 - 42.0 %    Monocytes % 9.4 2.0 - 12.0 %    Eosinophils % 5.3 0.0 - 6.0 %    Basophils % 0.9 0.0 - 2.0 %    Neutrophils Absolute 7.96 (H) 1.80 - 7.30 E9/L    Immature Granulocytes # 0.05 E9/L    Lymphocytes Absolute 2.19 1.50 - 4.00 E9/L    Monocytes Absolute 1.13 (H) 0.10 - 0.95 E9/L    Eosinophils Absolute 0.64 (H) 0.05 - 0.50 E9/L    Basophils Absolute 0.11 0.00 - 0.20 E9/L   Comprehensive Metabolic Panel w/ Reflex to MG   Result Value Ref Range    Sodium 141 132 - 146 mmol/L    Potassium reflex Magnesium 3.5 3.5 - 5.0 mmol/L    Chloride 98 98 - 107 mmol/L    CO2 30 (H) 22 - 29 mmol/L    Anion Gap 13 7 - 16 mmol/L    Glucose 116 (H) 74 - 99 mg/dL    BUN 14 6 - 23 mg/dL    Creatinine 0.7 0.5 - 1.0 mg/dL    GFR Non-African American >60 >=60 mL/min/1.73    GFR African American >60     Calcium 10.3 (H) 8.6 - 10.2 mg/dL    Total Protein 7.7 6.4 - 8.3 g/dL    Albumin 4.2 3.5 - 5.2 g/dL    Total Bilirubin 0.5 0.0 - 1.2 mg/dL    Alkaline Phosphatase 82 35 - 104 U/L    ALT 12 0 - 32 U/L    AST 12 0 - 31 U/L   Troponin   Result Value Ref Range    Troponin, High Sensitivity 7 0 - 9 ng/L   Brain Natriuretic Peptide   Result Value Ref Range    Pro- 0 - 125 pg/mL   Magnesium   Result Value Ref Range    Magnesium 2.0 1.6 - 2.6 mg/dL   EKG 12 Lead   Result Value Ref Range    Ventricular Rate 73 BPM    Atrial Rate 73 BPM    P-R Interval 136 ms    QRS Duration 92 ms    Q-T Interval 384 ms    QTc Calculation (Bazett) 423 ms    P Axis 59 degrees    R Axis 31 degrees    T Axis 49 degrees       RADIOLOGY:  Interpreted by Radiologist.  CTA PULMONARY W CONTRAST   Final Result   1. No evidence of pulmonary embolism. 2. New focal ground-glass opacities located in peripheral aspect of the left   lower lobe which could indicate developing pneumonia. 3. Previously seen areas of ground-glass opacity in right upper lobe, right   middle lobe, and left lower lobe have resolved. Persistent ground-glass   opacities in perihilar locations extending into right and left lower lobe and   lingula which could indicate persistent pneumonia or residual scarring and   areas of subsegmental atelectasis. 4. Redemonstration of 7 mm nodule in peripheral aspect of left lower lobe   appearing slightly larger compared to prior. Additional nodule located in   peripheral aspect of left lower lobe at costophrenic sulcus measures   approximately 7 mm and has increased in size compared to prior. 5. Additional nodule located in right upper lobe adjacent to the minor   fissure appears similar in size. 6. Subtle focal sclerosis involving central aspect of body of the manubrium. This finding appears similar compared to previous examination. Bone scan may   be helpful for further evaluation. 7. Cholelithiasis. XR CHEST (2 VW)   Final Result   No acute process. EKG Interpretation  Interpreted by emergency department physician    Rhythm: normal sinus   Rate: normal  Axis: normal  Conduction: normal  ST Segments: depression in  v3, v4, v5, v6, I, II, III, and aVf  T Waves: no acute change    Clinical Impression: non-specific EKG  Comparison to prior EKG: changes compared to previous EKG      ------------------------- NURSING NOTES AND VITALS REVIEWED ---------------------------   The nursing notes within the ED encounter and vital signs as below have been reviewed by myself.   BP (!) 152/79   Pulse 79   Temp 99.2 °F (37.3 °C) (Temporal)   Resp 18   Ht 5' 2\" (1.575 m)   Wt 154 lb (69.9 kg)   SpO2 92%   BMI 28.17 kg/m²   Oxygen Saturation Interpretation: Normal    The patients available past medical records and past encounters were reviewed. ------------------------------ ED COURSE/MEDICAL DECISION MAKING----------------------  Medications   cefTRIAXone (ROCEPHIN) 1,000 mg in sterile water 10 mL IV syringe (has no administration in time range)   azithromycin (ZITHROMAX) 500 mg in dextrose 5 % 250 mL IVPB ([id number]) (has no administration in time range)   ipratropium-albuterol (DUONEB) nebulizer solution 1 ampule (1 ampule Inhalation Given 9/11/22 1924)   methylPREDNISolone sodium (SOLU-MEDROL) injection 125 mg (125 mg IntraVENous Given 9/11/22 1923)   ipratropium-albuterol (DUONEB) nebulizer solution 1 ampule (1 ampule Inhalation Given 9/11/22 2010)   iopamidol (ISOVUE-370) 76 % injection 75 mL (75 mLs IntraVENous Given 9/11/22 2037)             Medical Decision Making: On arrival patient has diffuse wheezing. She was given a DuoNeb treatment and Solu-Medrol. Labs and imaging were obtained. White count of 12.1. COVID was negative. Troponin and BNP were within normal limits. Patient was ambulated in the emergency department after her breathing treatment. Her oxygen saturation dropped down to 87% on room air. She was given a second DuoNeb treatment. A CTA of the chest was obtained. No evidence of PE. New groundglass opacities were appreciated. Patient's COVID was negative. She was started on Rocephin and azithromycin to cover for pneumonia. She is now 88% on room air at rest.  She was placed on 2 L nasal cannula. I spoke with Dr. Donald Marroquin. He accepts admission of the patient. She is currently stable. Re-Evaluations:             Re-evaluation.   Patients symptoms are improving      Consultations:             Dr. Donald Marroquin accepts admission    Critical Care: 0        This patient's ED course included: a personal history and physicial examination, re-evaluation prior to disposition, multiple bedside re-evaluations, IV medications, cardiac monitoring, continuous pulse oximetry, and a personal history and physicial eaxmination    This patient has remained hemodynamically stable during their ED course. Counseling: The emergency provider has spoken with the patient and discussed todays results, in addition to providing specific details for the plan of care and counseling regarding the diagnosis and prognosis. Questions are answered at this time and they are agreeable with the plan.       --------------------------------- IMPRESSION AND DISPOSITION ---------------------------------    IMPRESSION  1. Acute respiratory failure with hypoxia ([address]    2. Pneumonia of both lungs due to infectious organism, unspecified part of lung        DISPOSITION  Disposition: Transfer to [facility]   Patient condition is stable        NOTE: This report was transcribed using voice recognition software.  Every effort was made to ensure accuracy; however, inadvertent computerized transcription errors may be present         Ge Mantilla MD  09/11/22 [id number]

## 2022-09-12 ENCOUNTER — HOSPITAL ENCOUNTER (INPATIENT)
Age: 62
LOS: 1 days | Discharge: HOME | DRG: 193 | End: 2022-09-14
Attending: INTERNAL MEDICINE | Admitting: INTERNAL MEDICINE
Payer: COMMERCIAL

## 2022-09-12 PROBLEM — J18.9 PNEUMONIA: Status: ACTIVE | Noted: 2022-09-12

## 2022-09-12 PROBLEM — J96.01 ACUTE RESPIRATORY FAILURE WITH HYPOXIA (HCC): Status: ACTIVE | Noted: 2022-09-12

## 2022-09-12 LAB
EKG ATRIAL RATE: 73 BPM
EKG P AXIS: 59 DEGREES
EKG P-R INTERVAL: 136 MS
EKG Q-T INTERVAL: 384 MS
EKG QRS DURATION: 92 MS
EKG QTC CALCULATION (BAZETT): 423 MS
EKG R AXIS: 31 DEGREES
EKG T AXIS: 49 DEGREES
EKG VENTRICULAR RATE: 73 BPM
L. PNEUMOPHILA SEROGP 1 UR AG: NORMAL
STREP PNEUMONIAE ANTIGEN, URINE: NORMAL

## 2022-09-12 PROCEDURE — 87449 NOS EACH ORGANISM AG IA: CPT

## 2022-09-12 PROCEDURE — 6370000000 HC RX 637 (ALT 250 FOR IP): Performed by: INTERNAL MEDICINE

## 2022-09-12 PROCEDURE — 6360000002 HC RX W HCPCS: Performed by: INTERNAL MEDICINE

## 2022-09-12 PROCEDURE — 2700000000 HC OXYGEN THERAPY PER DAY

## 2022-09-12 PROCEDURE — 99220 PR INITIAL OBSERVATION CARE/DAY 70 MINUTES: CPT | Performed by: INTERNAL MEDICINE

## 2022-09-12 PROCEDURE — G0378 HOSPITAL OBSERVATION PER HR: HCPCS

## 2022-09-12 PROCEDURE — 96376 TX/PRO/DX INJ SAME DRUG ADON: CPT

## 2022-09-12 PROCEDURE — 96365 THER/PROPH/DIAG IV INF INIT: CPT

## 2022-09-12 PROCEDURE — 96375 TX/PRO/DX INJ NEW DRUG ADDON: CPT

## 2022-09-12 PROCEDURE — G0379 DIRECT REFER HOSPITAL OBSERV: HCPCS

## 2022-09-12 PROCEDURE — 2580000003 HC RX 258

## 2022-09-12 PROCEDURE — 2580000003 HC RX 258: Performed by: INTERNAL MEDICINE

## 2022-09-12 PROCEDURE — 94640 AIRWAY INHALATION TREATMENT: CPT

## 2022-09-12 RX ORDER — SERTRALINE HYDROCHLORIDE 100 MG/1
100 TABLET, FILM COATED ORAL DAILY
Status: DISCONTINUED | OUTPATIENT
Start: 2022-09-12 | End: 2022-09-14 | Stop reason: HOSPADM

## 2022-09-12 RX ORDER — ONDANSETRON 4 MG/1
4 TABLET, ORALLY DISINTEGRATING ORAL EVERY 8 HOURS PRN
Status: DISCONTINUED | OUTPATIENT
Start: 2022-09-12 | End: 2022-09-14 | Stop reason: HOSPADM

## 2022-09-12 RX ORDER — ACETAMINOPHEN 325 MG/1
650 TABLET ORAL EVERY 6 HOURS PRN
Status: DISCONTINUED | OUTPATIENT
Start: 2022-09-12 | End: 2022-09-14 | Stop reason: HOSPADM

## 2022-09-12 RX ORDER — MONTELUKAST SODIUM 10 MG/1
10 TABLET ORAL NIGHTLY
Status: DISCONTINUED | OUTPATIENT
Start: 2022-09-12 | End: 2022-09-14 | Stop reason: HOSPADM

## 2022-09-12 RX ORDER — BUDESONIDE AND FORMOTEROL FUMARATE DIHYDRATE 160; 4.5 UG/1; UG/1
2 AEROSOL RESPIRATORY (INHALATION) 2 TIMES DAILY
Status: DISCONTINUED | OUTPATIENT
Start: 2022-09-12 | End: 2022-09-12 | Stop reason: CLARIF

## 2022-09-12 RX ORDER — ATORVASTATIN CALCIUM 20 MG/1
20 TABLET, FILM COATED ORAL DAILY
Status: ON HOLD | COMMUNITY
End: 2022-09-12

## 2022-09-12 RX ORDER — ARFORMOTEROL TARTRATE 15 UG/2ML
15 SOLUTION RESPIRATORY (INHALATION) 2 TIMES DAILY
Status: DISCONTINUED | OUTPATIENT
Start: 2022-09-12 | End: 2022-09-14 | Stop reason: HOSPADM

## 2022-09-12 RX ORDER — ATORVASTATIN CALCIUM 20 MG/1
TABLET, FILM COATED ORAL
Qty: 30 TABLET | Refills: 5 | Status: SHIPPED | OUTPATIENT
Start: 2022-09-12

## 2022-09-12 RX ORDER — POLYETHYLENE GLYCOL 3350 17 G/17G
17 POWDER, FOR SOLUTION ORAL DAILY PRN
Status: DISCONTINUED | OUTPATIENT
Start: 2022-09-12 | End: 2022-09-14 | Stop reason: HOSPADM

## 2022-09-12 RX ORDER — SODIUM CHLORIDE 0.9 % (FLUSH) 0.9 %
5-40 SYRINGE (ML) INJECTION PRN
Status: DISCONTINUED | OUTPATIENT
Start: 2022-09-12 | End: 2022-09-14 | Stop reason: HOSPADM

## 2022-09-12 RX ORDER — ACETAMINOPHEN 650 MG/1
650 SUPPOSITORY RECTAL EVERY 6 HOURS PRN
Status: DISCONTINUED | OUTPATIENT
Start: 2022-09-12 | End: 2022-09-14 | Stop reason: HOSPADM

## 2022-09-12 RX ORDER — ATORVASTATIN CALCIUM 20 MG/1
20 TABLET, FILM COATED ORAL DAILY
Status: DISCONTINUED | OUTPATIENT
Start: 2022-09-12 | End: 2022-09-14 | Stop reason: HOSPADM

## 2022-09-12 RX ORDER — SODIUM CHLORIDE 9 MG/ML
INJECTION, SOLUTION INTRAVENOUS PRN
Status: DISCONTINUED | OUTPATIENT
Start: 2022-09-12 | End: 2022-09-14 | Stop reason: HOSPADM

## 2022-09-12 RX ORDER — ONDANSETRON 2 MG/ML
4 INJECTION INTRAMUSCULAR; INTRAVENOUS EVERY 6 HOURS PRN
Status: DISCONTINUED | OUTPATIENT
Start: 2022-09-12 | End: 2022-09-14 | Stop reason: HOSPADM

## 2022-09-12 RX ORDER — OMEPRAZOLE 20 MG/1
20 CAPSULE, DELAYED RELEASE ORAL DAILY
COMMUNITY

## 2022-09-12 RX ORDER — SODIUM CHLORIDE 0.9 % (FLUSH) 0.9 %
5-40 SYRINGE (ML) INJECTION EVERY 12 HOURS SCHEDULED
Status: DISCONTINUED | OUTPATIENT
Start: 2022-09-12 | End: 2022-09-14 | Stop reason: HOSPADM

## 2022-09-12 RX ORDER — HYDROCHLOROTHIAZIDE 25 MG/1
25 TABLET ORAL DAILY
Status: DISCONTINUED | OUTPATIENT
Start: 2022-09-12 | End: 2022-09-14 | Stop reason: HOSPADM

## 2022-09-12 RX ORDER — IPRATROPIUM BROMIDE AND ALBUTEROL SULFATE 2.5; .5 MG/3ML; MG/3ML
1 SOLUTION RESPIRATORY (INHALATION)
Status: DISCONTINUED | OUTPATIENT
Start: 2022-09-12 | End: 2022-09-14 | Stop reason: HOSPADM

## 2022-09-12 RX ORDER — METHYLPREDNISOLONE SODIUM SUCCINATE 40 MG/ML
40 INJECTION, POWDER, LYOPHILIZED, FOR SOLUTION INTRAMUSCULAR; INTRAVENOUS EVERY 8 HOURS
Status: DISCONTINUED | OUTPATIENT
Start: 2022-09-12 | End: 2022-09-14 | Stop reason: HOSPADM

## 2022-09-12 RX ORDER — MONTELUKAST SODIUM 10 MG/1
10 TABLET ORAL NIGHTLY
COMMUNITY
End: 2022-10-12

## 2022-09-12 RX ORDER — HYDROCHLOROTHIAZIDE 25 MG/1
25 TABLET ORAL DAILY
Qty: 30 TABLET | Refills: 5 | Status: SHIPPED | OUTPATIENT
Start: 2022-09-12

## 2022-09-12 RX ORDER — BUDESONIDE 0.5 MG/2ML
0.5 INHALANT ORAL 2 TIMES DAILY
Status: DISCONTINUED | OUTPATIENT
Start: 2022-09-12 | End: 2022-09-14 | Stop reason: HOSPADM

## 2022-09-12 RX ADMIN — HYDROCHLOROTHIAZIDE 25 MG: 25 TABLET ORAL at 07:36

## 2022-09-12 RX ADMIN — AZITHROMYCIN DIHYDRATE 500 MG: 500 INJECTION, POWDER, LYOPHILIZED, FOR SOLUTION INTRAVENOUS at 23:11

## 2022-09-12 RX ADMIN — METHYLPREDNISOLONE SODIUM SUCCINATE 40 MG: 40 INJECTION, POWDER, LYOPHILIZED, FOR SOLUTION INTRAMUSCULAR; INTRAVENOUS at 11:34

## 2022-09-12 RX ADMIN — SERTRALINE 100 MG: 100 TABLET, FILM COATED ORAL at 07:36

## 2022-09-12 RX ADMIN — SODIUM CHLORIDE, PRESERVATIVE FREE 10 ML: 5 INJECTION INTRAVENOUS at 21:37

## 2022-09-12 RX ADMIN — IPRATROPIUM BROMIDE AND ALBUTEROL SULFATE 1 AMPULE: .5; 2.5 SOLUTION RESPIRATORY (INHALATION) at 20:26

## 2022-09-12 RX ADMIN — ARFORMOTEROL TARTRATE 15 MCG: 15 SOLUTION RESPIRATORY (INHALATION) at 09:17

## 2022-09-12 RX ADMIN — IPRATROPIUM BROMIDE AND ALBUTEROL SULFATE 1 AMPULE: .5; 2.5 SOLUTION RESPIRATORY (INHALATION) at 16:57

## 2022-09-12 RX ADMIN — IPRATROPIUM BROMIDE AND ALBUTEROL SULFATE 1 AMPULE: .5; 2.5 SOLUTION RESPIRATORY (INHALATION) at 13:24

## 2022-09-12 RX ADMIN — ATORVASTATIN CALCIUM 20 MG: 20 TABLET, FILM COATED ORAL at 07:36

## 2022-09-12 RX ADMIN — WATER 10 ML: 1 INJECTION INTRAMUSCULAR; INTRAVENOUS; SUBCUTANEOUS at 11:34

## 2022-09-12 RX ADMIN — BUDESONIDE 500 MCG: 0.5 SUSPENSION RESPIRATORY (INHALATION) at 20:25

## 2022-09-12 RX ADMIN — BUDESONIDE 500 MCG: 0.5 SUSPENSION RESPIRATORY (INHALATION) at 09:16

## 2022-09-12 RX ADMIN — SODIUM CHLORIDE, PRESERVATIVE FREE 10 ML: 5 INJECTION INTRAVENOUS at 07:36

## 2022-09-12 RX ADMIN — IPRATROPIUM BROMIDE AND ALBUTEROL SULFATE 1 AMPULE: .5; 2.5 SOLUTION RESPIRATORY (INHALATION) at 09:16

## 2022-09-12 RX ADMIN — CEFTRIAXONE 1000 MG: 1 INJECTION, POWDER, FOR SOLUTION INTRAMUSCULAR; INTRAVENOUS at 23:02

## 2022-09-12 RX ADMIN — MONTELUKAST SODIUM 10 MG: 10 TABLET ORAL at 21:38

## 2022-09-12 RX ADMIN — ARFORMOTEROL TARTRATE 15 MCG: 15 SOLUTION RESPIRATORY (INHALATION) at 20:26

## 2022-09-12 RX ADMIN — METHYLPREDNISOLONE SODIUM SUCCINATE 40 MG: 40 INJECTION, POWDER, LYOPHILIZED, FOR SOLUTION INTRAMUSCULAR; INTRAVENOUS at 04:18

## 2022-09-12 RX ADMIN — METHYLPREDNISOLONE SODIUM SUCCINATE 40 MG: 40 INJECTION, POWDER, LYOPHILIZED, FOR SOLUTION INTRAMUSCULAR; INTRAVENOUS at 21:37

## 2022-09-12 ASSESSMENT — PAIN - FUNCTIONAL ASSESSMENT: PAIN_FUNCTIONAL_ASSESSMENT: NONE - DENIES PAIN

## 2022-09-12 NOTE — PROGRESS NOTES
Transfer note:    Received call from [facility] regarding transfer this patient. Reportedly has had a week of shortness of breath. Does have a history of asthma. Shortness of breath has gotten worse to the point where she came to the ED. Was found to be hypoxemic. CT scan did show groundglass appearance. COVID test negative. Coming in for pneumonia.

## 2022-09-12 NOTE — PROGRESS NOTES
Patient was admitted post night. She reports that she is feeling much better. Reported that she had pna vaccine in the past. Aware of plans to continue to wean her off oxygen, add incentive spirometry with goal of discharging her home without oxygen if possible. Patient agreeable to plan and noted that she is feeling a lot better compared to admission,  Continue current abx  Added incentive spirometry. Nurse communication entered for help with weaning off oxygen.     Encourage out of bed as tolerated    Rest of management as noted in today's H and P.

## 2022-09-12 NOTE — ED NOTES
Patient ambulated on room air with pulse ox. O2 sat dropped to 85% on room air. HR 60-90bpm. Patient o2 sat increased to 91% on room air when sitting. Physician notified.      Charlee Zimmerman RN  09/11/22 2003

## 2022-09-12 NOTE — ED NOTES
Pt ambulated to the restroom , upon return spo2 83% on room air. O2  at 2l per nc placed, spo2 93%. Mild sob noted, dyspnea upon minimal exertion.       Casey Manrique RN  09/11/22 [id number]

## 2022-09-12 NOTE — ED NOTES
Pt reports no symptoms at this time. Denies sob or chest tightness. Spo2 90% on room air. Resp easy and nonlabored at this time.       Casey Manrique RN  09/11/22 [id number]

## 2022-09-12 NOTE — H&P
[facility] History and Physical    Perpetual assessment: 57 female past medical history of asthma depression and hyperlipidemia presents with cough and shortness of breath. Perpetual assessment:    Atypical pneumonia with acute hypoxic respiratory failure  -Imaging does have sounds of the groundglass appearance  -COVID has been negative x2  -We will continue IV Rocephin azithromycin  -Await sputum cultures and sensitivities  -Check urine streptococcal and Legionella antigens  -Continue DuoNeb around-the-clock  -Wean oxygen as indicated    Mild intermittent asthma  -Suspect recent diagnosis for her  -Is on Symbicort resume  -Does have some slight wheeze we will continue IV steroids for now    Depression  -No acute suicide ideation  -Continue patient's sertraline    Essential hypertension/hyperlipidemia  -Both are stable  -Continue hydrochlorothiazide and Lipitor    Code Status: Full  DVT prophylaxis: SCDs      CHIEF COMPLAINT: Shortness of breath    History of Present Illness: This is a 65-year-old female who presents to [facility] above-stated complaint. All history has been taken from the patient and review of medical records. For the past week she has been aside from a cough and shortness of breath. She has had no fevers but has some mild chills at times. The cough has been progressively getting worse that she 5 decided come to ED for evaluation. There she was found mildly bit hypoxemic with 88% on room air. This quickly improved with supplemental oxygen. Imaging done did not show signs any pulmonary embolisms but it did show groundglass appearance in both lung fields. COVID test was negative. Felt patient mostly had atypical pneumonia and with her hypoxemia will require inpatient treatment. Since receiving her nebulizer treatments and steroids and Antivert she feels little bit better. We will continue the treatments while she is here.   Hopefully she can be discharged when she is no longer requiring supplemental oxygen. 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 Dr. William Nelson  2010    Dr. Becky Espino - normal    COLONOSCOPY  03/01/2017    Trang Woody    EYE SURGERY      Cataract Surgery (Dr. Erik Camejo)- 07/2014     LAPAROSCOPY  1991    TONSILLECTOMY      UPPER GASTROINTESTINAL ENDOSCOPY  2005    Dr. Trang Woody GERD    UPPER GASTROINTESTINAL ENDOSCOPY  04/15/2005       Medications Prior to Admission:    Prior to Admission medications    Medication Sig Start Date End Date Taking? Authorizing Provider   montelukast (SINGULAIR) 10 MG tablet Take 10 mg by mouth nightly   Yes Historical Provider, MD   omeprazole (PRILOSEC) 20 MG delayed release capsule Take 20 mg by mouth daily   Yes Historical Provider, MD   atorvastatin (LIPITOR) 20 MG tablet Take 20 mg by mouth daily   Yes Historical Provider, MD   budesonide-formoterol (SYMBICORT) 160-4.5 MCG/ACT AERO Inhale 2 puffs into the lungs in the morning and 2 puffs before bedtime.  7/19/22   Octavio Mcdaniels,    sertraline (ZOLOFT) 100 MG tablet Take one and a half tablets daily  Patient taking differently: Take 100 mg by mouth daily 5/19/22   Octavio Dick Enedelia, DO   albuterol sulfate  (90 Base) MCG/ACT inhaler INHALE 2 PUFFS INTO THE LUNGS EVERY 6 HOURS AS NEEDED FOR WHEEZING 4/28/22   Moi FROST Enedelia DO   hydroCHLOROthiazide (HYDRODIURIL) 25 MG tablet TAKE 1 TABLET BY MOUTH DAILY 3/21/22   Shelly Mcdaniels, DO   Cholecalciferol (VITAMIN D3) 2000 UNITS CAPS Take 2,000 Units by mouth daily    Historical Provider, MD       Allergies:    Elemental sulfur, Doxycycline, and Sulfa antibiotics    Social History:    reports that she has never smoked. She has never used smokeless tobacco. She reports current alcohol use. She reports that she does not use drugs. Family History:   family history includes Arthritis in her maternal grandmother, paternal aunt, and paternal grandmother; Cancer in her paternal grandmother; Diabetes in her paternal aunt and paternal grandmother; Heart Disease in her father, paternal aunt, paternal grandmother, and paternal uncle; High Blood Pressure in her maternal aunt, mother, paternal aunt, paternal grandmother, paternal uncle, and sister; High Cholesterol in her father, paternal aunt, paternal grandmother, and sister; Kidney Disease in her paternal grandfather; Stroke in her paternal aunt. PHYSICAL EXAM:  Vitals:  /84   Pulse 80   Temp 97.5 °F (36.4 °C) (Oral)   Resp 18   Ht 5' 2\" (1.575 m)   Wt 155 lb 4 oz (70.4 kg)   SpO2 93%   BMI 28.40 kg/m²     General Appearance: alert and oriented to person, place and time and in no acute distress  Skin: warm and dry  Head: normocephalic and atraumatic  Eyes: pupils equal, round, and reactive to light, extraocular eye movements intact, conjunctivae normal  Neck: neck supple and non tender without mass   Pulmonary/Chest: Fine crackles heard in upper lung fields with some slight wheeze heard at time. Lung expands equally bilaterally.   Cardiovascular: normal rate, normal S1 and S2 and no carotid bruits  Abdomen: soft, non-tender, non-distended, normal bowel sounds, no masses or organomegaly  Extremities: no cyanosis, no clubbing and no edema  Neurologic: no cranial nerve deficit and speech normal        LABS:  Recent Labs     09/11/22 1913      K 3.5   CL 98   CO2 30*   BUN 14   CREATININE 0.7   GLUCOSE 116*   CALCIUM 10.3*       Recent Labs     09/11/22 1913   WBC 12.1*   RBC 5.19   HGB 14.6   HCT 43.7   MCV 84.2   MCH 28.1   MCHC 33.4   RDW 13.5      MPV 8.7       No results for input(s): POCGLU in the last 72 hours. Radiology:   No orders to display       EKG:       NOTE: This report was transcribed using voice recognition software. Every effort was made to ensure accuracy; however, inadvertent computerized transcription errors may be present.   Electronically signed by Panda Morales MD on 9/12/2022 at 3:48 AM

## 2022-09-13 LAB
ADENOVIRUS BY PCR: NOT DETECTED
ANION GAP SERPL CALCULATED.3IONS-SCNC: 11 MMOL/L (ref 7–16)
BASOPHILS ABSOLUTE: 0.02 E9/L (ref 0–0.2)
BASOPHILS RELATIVE PERCENT: 0.1 % (ref 0–2)
BORDETELLA PARAPERTUSSIS BY PCR: NOT DETECTED
BORDETELLA PERTUSSIS BY PCR: NOT DETECTED
BUN BLDV-MCNC: 17 MG/DL (ref 6–23)
CALCIUM SERPL-MCNC: 9.9 MG/DL (ref 8.6–10.2)
CHLAMYDOPHILIA PNEUMONIAE BY PCR: NOT DETECTED
CHLORIDE BLD-SCNC: 100 MMOL/L (ref 98–107)
CO2: 29 MMOL/L (ref 22–29)
CORONAVIRUS 229E BY PCR: NOT DETECTED
CORONAVIRUS HKU1 BY PCR: NOT DETECTED
CORONAVIRUS NL63 BY PCR: NOT DETECTED
CORONAVIRUS OC43 BY PCR: NOT DETECTED
CREAT SERPL-MCNC: 0.7 MG/DL (ref 0.5–1)
EOSINOPHILS ABSOLUTE: 0 E9/L (ref 0.05–0.5)
EOSINOPHILS RELATIVE PERCENT: 0 % (ref 0–6)
GFR AFRICAN AMERICAN: >60
GFR NON-AFRICAN AMERICAN: >60 ML/MIN/1.73
GLUCOSE BLD-MCNC: 112 MG/DL (ref 74–99)
HCT VFR BLD CALC: 41.5 % (ref 34–48)
HEMOGLOBIN: 13.8 G/DL (ref 11.5–15.5)
HUMAN METAPNEUMOVIRUS BY PCR: NOT DETECTED
HUMAN RHINOVIRUS/ENTEROVIRUS BY PCR: NOT DETECTED
IMMATURE GRANULOCYTES #: 0.12 E9/L
IMMATURE GRANULOCYTES %: 0.6 % (ref 0–5)
INFLUENZA A BY PCR: NOT DETECTED
INFLUENZA B BY PCR: NOT DETECTED
LYMPHOCYTES ABSOLUTE: 1.3 E9/L (ref 1.5–4)
LYMPHOCYTES RELATIVE PERCENT: 6 % (ref 20–42)
MAGNESIUM: 2.4 MG/DL (ref 1.6–2.6)
MCH RBC QN AUTO: 28.6 PG (ref 26–35)
MCHC RBC AUTO-ENTMCNC: 33.3 % (ref 32–34.5)
MCV RBC AUTO: 85.9 FL (ref 80–99.9)
MONOCYTES ABSOLUTE: 0.87 E9/L (ref 0.1–0.95)
MONOCYTES RELATIVE PERCENT: 4 % (ref 2–12)
MYCOPLASMA PNEUMONIAE BY PCR: NOT DETECTED
NEUTROPHILS ABSOLUTE: 19.21 E9/L (ref 1.8–7.3)
NEUTROPHILS RELATIVE PERCENT: 89.3 % (ref 43–80)
PARAINFLUENZA VIRUS 1 BY PCR: NOT DETECTED
PARAINFLUENZA VIRUS 2 BY PCR: NOT DETECTED
PARAINFLUENZA VIRUS 3 BY PCR: NOT DETECTED
PARAINFLUENZA VIRUS 4 BY PCR: NOT DETECTED
PDW BLD-RTO: 13.5 FL (ref 11.5–15)
PLATELET # BLD: 363 E9/L (ref 130–450)
PMV BLD AUTO: 8.8 FL (ref 7–12)
POTASSIUM REFLEX MAGNESIUM: 3.4 MMOL/L (ref 3.5–5)
RBC # BLD: 4.83 E12/L (ref 3.5–5.5)
RESPIRATORY SYNCYTIAL VIRUS BY PCR: NOT DETECTED
SARS-COV-2, PCR: NOT DETECTED
SODIUM BLD-SCNC: 140 MMOL/L (ref 132–146)
WBC # BLD: 21.5 E9/L (ref 4.5–11.5)

## 2022-09-13 PROCEDURE — 6360000002 HC RX W HCPCS: Performed by: INTERNAL MEDICINE

## 2022-09-13 PROCEDURE — 96376 TX/PRO/DX INJ SAME DRUG ADON: CPT

## 2022-09-13 PROCEDURE — 82787 IGG 1 2 3 OR 4 EACH: CPT

## 2022-09-13 PROCEDURE — 2700000000 HC OXYGEN THERAPY PER DAY

## 2022-09-13 PROCEDURE — 96366 THER/PROPH/DIAG IV INF ADDON: CPT

## 2022-09-13 PROCEDURE — 6370000000 HC RX 637 (ALT 250 FOR IP): Performed by: INTERNAL MEDICINE

## 2022-09-13 PROCEDURE — 83735 ASSAY OF MAGNESIUM: CPT

## 2022-09-13 PROCEDURE — 80048 BASIC METABOLIC PNL TOTAL CA: CPT

## 2022-09-13 PROCEDURE — 36415 COLL VENOUS BLD VENIPUNCTURE: CPT

## 2022-09-13 PROCEDURE — G0378 HOSPITAL OBSERVATION PER HR: HCPCS

## 2022-09-13 PROCEDURE — 94640 AIRWAY INHALATION TREATMENT: CPT

## 2022-09-13 PROCEDURE — 85025 COMPLETE CBC W/AUTO DIFF WBC: CPT

## 2022-09-13 PROCEDURE — 99233 SBSQ HOSP IP/OBS HIGH 50: CPT | Performed by: INTERNAL MEDICINE

## 2022-09-13 PROCEDURE — 6370000000 HC RX 637 (ALT 250 FOR IP): Performed by: NURSE PRACTITIONER

## 2022-09-13 PROCEDURE — 0202U NFCT DS 22 TRGT SARS-COV-2: CPT

## 2022-09-13 PROCEDURE — 2580000003 HC RX 258: Performed by: INTERNAL MEDICINE

## 2022-09-13 RX ORDER — POTASSIUM CHLORIDE 20 MEQ/1
20 TABLET, EXTENDED RELEASE ORAL ONCE
Status: COMPLETED | OUTPATIENT
Start: 2022-09-13 | End: 2022-09-13

## 2022-09-13 RX ADMIN — BUDESONIDE 500 MCG: 0.5 SUSPENSION RESPIRATORY (INHALATION) at 20:10

## 2022-09-13 RX ADMIN — MONTELUKAST SODIUM 10 MG: 10 TABLET ORAL at 20:32

## 2022-09-13 RX ADMIN — CEFTRIAXONE 1000 MG: 1 INJECTION, POWDER, FOR SOLUTION INTRAMUSCULAR; INTRAVENOUS at 22:20

## 2022-09-13 RX ADMIN — IPRATROPIUM BROMIDE AND ALBUTEROL SULFATE 1 AMPULE: .5; 2.5 SOLUTION RESPIRATORY (INHALATION) at 09:19

## 2022-09-13 RX ADMIN — METHYLPREDNISOLONE SODIUM SUCCINATE 40 MG: 40 INJECTION, POWDER, LYOPHILIZED, FOR SOLUTION INTRAMUSCULAR; INTRAVENOUS at 12:28

## 2022-09-13 RX ADMIN — SERTRALINE 100 MG: 100 TABLET, FILM COATED ORAL at 09:15

## 2022-09-13 RX ADMIN — AZITHROMYCIN DIHYDRATE 500 MG: 500 INJECTION, POWDER, LYOPHILIZED, FOR SOLUTION INTRAVENOUS at 22:25

## 2022-09-13 RX ADMIN — HYDROCHLOROTHIAZIDE 25 MG: 25 TABLET ORAL at 09:15

## 2022-09-13 RX ADMIN — IPRATROPIUM BROMIDE AND ALBUTEROL SULFATE 1 AMPULE: .5; 2.5 SOLUTION RESPIRATORY (INHALATION) at 20:10

## 2022-09-13 RX ADMIN — METHYLPREDNISOLONE SODIUM SUCCINATE 40 MG: 40 INJECTION, POWDER, LYOPHILIZED, FOR SOLUTION INTRAMUSCULAR; INTRAVENOUS at 06:25

## 2022-09-13 RX ADMIN — SODIUM CHLORIDE, PRESERVATIVE FREE 10 ML: 5 INJECTION INTRAVENOUS at 08:33

## 2022-09-13 RX ADMIN — ARFORMOTEROL TARTRATE 15 MCG: 15 SOLUTION RESPIRATORY (INHALATION) at 20:10

## 2022-09-13 RX ADMIN — IPRATROPIUM BROMIDE AND ALBUTEROL SULFATE 1 AMPULE: .5; 2.5 SOLUTION RESPIRATORY (INHALATION) at 16:22

## 2022-09-13 RX ADMIN — SODIUM CHLORIDE, PRESERVATIVE FREE 10 ML: 5 INJECTION INTRAVENOUS at 22:21

## 2022-09-13 RX ADMIN — POTASSIUM CHLORIDE 20 MEQ: 1500 TABLET, EXTENDED RELEASE ORAL at 23:36

## 2022-09-13 RX ADMIN — METHYLPREDNISOLONE SODIUM SUCCINATE 40 MG: 40 INJECTION, POWDER, LYOPHILIZED, FOR SOLUTION INTRAMUSCULAR; INTRAVENOUS at 20:33

## 2022-09-13 RX ADMIN — BUDESONIDE 500 MCG: 0.5 SUSPENSION RESPIRATORY (INHALATION) at 09:19

## 2022-09-13 RX ADMIN — IPRATROPIUM BROMIDE AND ALBUTEROL SULFATE 1 AMPULE: .5; 2.5 SOLUTION RESPIRATORY (INHALATION) at 13:07

## 2022-09-13 RX ADMIN — ARFORMOTEROL TARTRATE 15 MCG: 15 SOLUTION RESPIRATORY (INHALATION) at 09:19

## 2022-09-13 RX ADMIN — ATORVASTATIN CALCIUM 20 MG: 20 TABLET, FILM COATED ORAL at 09:15

## 2022-09-13 NOTE — CONSULTS
°C) (Oral)   Resp 18   Ht 5' 2\" (1.575 m)   Wt 163 lb 1.6 oz (74 kg)   SpO2 96%   BMI 29.83 kg/m²   24HR INTAKE/OUTPUT:  No intake or output data in the 24 hours ending 22 1241  CURRENT PULSE OXIMETRY:  SpO2: 96 %  24HR PULSE OXIMETRY RANGE:  SpO2  Av.2 %  Min: 95 %  Max: 97 %    EXAM:  General: No distress. Alert. Eyes: PERRL. No sclera icterus. No conjunctival injection. ENT: No discharge. Pharynx clear. Neck: Trachea midline. Normal thyroid. No jvd, no hjr. Resp: No wheezing. No accessory muscle use. Left lower lobe rales. No rhonchi. CV: Regular rate. Regular rhythm. No murmur No rub. Abd: Non-tender. Non-distended. No masses. No organmegaly. Normal bowel sounds. Skin: Warm and dry. No nodule on exposed extremities. No rash on exposed extremities. Lymph: No cervical LAD. No supraclavicular LAD. Ext: No joint deformity. No clubbing. No cyanosis. No edema  Neuro: Awake. Follows commands. Positive pupils/gag/corneals. Normal pain response. Lab Results:  CBC:   Recent Labs     22  0655   WBC 12.1* 21.5*   HGB 14.6 13.8   HCT 43.7 41.5   MCV 84.2 85.9    363       BMP:  Recent Labs     22  0655    140   K 3.5 3.4*   CL 98 100   CO2 30* 29   BUN 14 17   CREATININE 0.7 0.7    ALB:3,BILIDIR:3,BILITOT:3,ALKPHOS:3)@    PT/INR: No results for input(s): PROTIME, INR in the last 72 hours. Cultures:  Sputum: not available  Blood: not available    ABG:   No results for input(s): PH, PO2, PCO2, HCO3, BE, O2SAT, METHB, O2HB, COHB, O2CON, HHB, THB in the last 72 hours. Films:     No orders to display   . Assessment:  ReCurrent pneumonia: Unclear etiology. The different location suggest this is not anatomic in origin. Underlying immunodeficiency is a consideration. Plan:  Check viral titers, Mycoplasma.     Check IgG total and subclasses to rule out combined variable immunodeficiency      Thanks for letting us see this patient in consultation. Total time in reviewing the previous admissions and records, reviewing the current x-rays, labs, and discussing with clinical staff including nursing and physicians, exceeded 50 minutes. Please contact us with any questions. Office [phone] or after hours through [employer], x [phone]. Please note that voice recognition technology was used (while wearing a Covid mandated mask) in the preparation of this note and make therefore it may contain inadvertent transcription errors. If the patient is a COVID 19 isolation patient, the above physical exam reflects that of the examining physician for the day. Cassia Schafer MD,  MVALE., F.C.C.P.     [facility], [address], [address], [facility]  Office Visits:  BERNARDO [facility] C[facility]

## 2022-09-13 NOTE — PROGRESS NOTES
P Quality Flow/Interdisciplinary Rounds Progress Note        Quality Flow Rounds held on September 13, 2022    Disciplines Attending:  Bedside Nurse, , , and Nursing Unit Leadership    Kate Sutherland was admitted on 9/12/2022  2:08 AM    Anticipated Discharge Date:       Disposition:    Daniel Score:  Daniel Scale Score: 22    Readmission Risk              Risk of Unplanned Readmission:  0           Discussed patient goal for the day, patient clinical progression, and barriers to discharge.   The following Goal(s) of the Day/Commitment(s) have been identified:   solumedrol q12h      Vitor Nath RN  September 13, 2022

## 2022-09-13 NOTE — PROGRESS NOTES
(74 kg)   SpO2 96%   BMI 29.83 kg/m²   CONSTITUTIONAL:  awake, alert, and cooperative  EYES:  extra-ocular muscles intact  ENT:  normocepalic, without obvious abnormality  LUNGS:  no increased work of breathing, no retractions, and no crackles or wheezing  CARDIOVASCULAR:  normal apical pulses and normal S1 and S2  ABDOMEN:  normal bowel sounds, non-distended, and non-tender  MUSCULOSKELETAL:  there is no redness, warmth, or swelling of the joints  NEUROLOGIC:  Mental Status Exam:  Level of Alertness:   awake  Orientation:   person, place, time  SKIN:  no bruising or bleeding  Data    CBC:   Lab Results   Component Value Date/Time    WBC 21.5 09/13/2022 06:55 AM    RBC 4.83 09/13/2022 06:55 AM    HGB 13.8 09/13/2022 06:55 AM    HCT 41.5 09/13/2022 06:55 AM    MCV 85.9 09/13/2022 06:55 AM    MCH 28.6 09/13/2022 06:55 AM    MCHC 33.3 09/13/2022 06:55 AM    RDW 13.5 09/13/2022 06:55 AM     09/13/2022 06:55 AM    MPV 8.8 09/13/2022 06:55 AM     BMP:    Lab Results   Component Value Date/Time     09/13/2022 06:55 AM    K 3.4 09/13/2022 06:55 AM     09/13/2022 06:55 AM    CO2 29 09/13/2022 06:55 AM    BUN 17 09/13/2022 06:55 AM    LABALBU 4.2 09/11/2022 07:13 PM    CREATININE 0.7 09/13/2022 06:55 AM    CALCIUM 9.9 09/13/2022 06:55 AM    GFRAA >60 09/13/2022 06:55 AM    LABGLOM >60 09/13/2022 06:55 AM    GLUCOSE 112 09/13/2022 06:55 AM       ASSESSMENT AND PLAN      1. Pneumonia: symptoms seems much improved. Continue current abx.     2.  Acute respiratory failure with hypoxia: much improved. Continue breathing treatment. Incentive spirometry. Continue solu-medrol    3. Hx of DCIS: s/p radiation and resection circa 2013    4. Pulm Nodules: Unknown etiology, but since this is persistent and in follow up CT, plan to get pulmonology involved. Also will like evaluation and assessment for etiology of persistent ground glass findings. Possible PET scan as recommended as outpatient.      5.  Essential hypertension/Hyperlipidemia: continue home meds.      6.  Hypokalemia: Added K-dur

## 2022-09-14 VITALS
HEART RATE: 77 BPM | BODY MASS INDEX: 30.01 KG/M2 | RESPIRATION RATE: 16 BRPM | HEIGHT: 62 IN | OXYGEN SATURATION: 95 % | TEMPERATURE: 98.1 F | WEIGHT: 163.1 LBS | DIASTOLIC BLOOD PRESSURE: 61 MMHG | SYSTOLIC BLOOD PRESSURE: 131 MMHG

## 2022-09-14 LAB
ANION GAP SERPL CALCULATED.3IONS-SCNC: 13 MMOL/L (ref 7–16)
BASOPHILS ABSOLUTE: 0.02 E9/L (ref 0–0.2)
BASOPHILS RELATIVE PERCENT: 0.1 % (ref 0–2)
BUN BLDV-MCNC: 19 MG/DL (ref 6–23)
CALCIUM SERPL-MCNC: 9.8 MG/DL (ref 8.6–10.2)
CHLORIDE BLD-SCNC: 98 MMOL/L (ref 98–107)
CO2: 28 MMOL/L (ref 22–29)
CREAT SERPL-MCNC: 0.6 MG/DL (ref 0.5–1)
EOSINOPHILS ABSOLUTE: 0 E9/L (ref 0.05–0.5)
EOSINOPHILS RELATIVE PERCENT: 0 % (ref 0–6)
GFR AFRICAN AMERICAN: >60
GFR NON-AFRICAN AMERICAN: >60 ML/MIN/1.73
GLUCOSE BLD-MCNC: 130 MG/DL (ref 74–99)
HCT VFR BLD CALC: 43.5 % (ref 34–48)
HEMOGLOBIN: 14 G/DL (ref 11.5–15.5)
IMMATURE GRANULOCYTES #: 0.14 E9/L
IMMATURE GRANULOCYTES %: 0.7 % (ref 0–5)
LYMPHOCYTES ABSOLUTE: 1.22 E9/L (ref 1.5–4)
LYMPHOCYTES RELATIVE PERCENT: 6.1 % (ref 20–42)
MCH RBC QN AUTO: 28.2 PG (ref 26–35)
MCHC RBC AUTO-ENTMCNC: 32.2 % (ref 32–34.5)
MCV RBC AUTO: 87.7 FL (ref 80–99.9)
MONOCYTES ABSOLUTE: 0.75 E9/L (ref 0.1–0.95)
MONOCYTES RELATIVE PERCENT: 3.7 % (ref 2–12)
NEUTROPHILS ABSOLUTE: 17.89 E9/L (ref 1.8–7.3)
NEUTROPHILS RELATIVE PERCENT: 89.4 % (ref 43–80)
PDW BLD-RTO: 13.7 FL (ref 11.5–15)
PLATELET # BLD: 362 E9/L (ref 130–450)
PMV BLD AUTO: 8.8 FL (ref 7–12)
POTASSIUM SERPL-SCNC: 3.7 MMOL/L (ref 3.5–5)
RBC # BLD: 4.96 E12/L (ref 3.5–5.5)
SODIUM BLD-SCNC: 139 MMOL/L (ref 132–146)
WBC # BLD: 20 E9/L (ref 4.5–11.5)

## 2022-09-14 PROCEDURE — G0378 HOSPITAL OBSERVATION PER HR: HCPCS

## 2022-09-14 PROCEDURE — 36415 COLL VENOUS BLD VENIPUNCTURE: CPT

## 2022-09-14 PROCEDURE — 99239 HOSP IP/OBS DSCHRG MGMT >30: CPT | Performed by: INTERNAL MEDICINE

## 2022-09-14 PROCEDURE — 94640 AIRWAY INHALATION TREATMENT: CPT

## 2022-09-14 PROCEDURE — 1200000000 HC SEMI PRIVATE

## 2022-09-14 PROCEDURE — 85025 COMPLETE CBC W/AUTO DIFF WBC: CPT

## 2022-09-14 PROCEDURE — 6360000002 HC RX W HCPCS: Performed by: INTERNAL MEDICINE

## 2022-09-14 PROCEDURE — 6370000000 HC RX 637 (ALT 250 FOR IP): Performed by: INTERNAL MEDICINE

## 2022-09-14 PROCEDURE — 80048 BASIC METABOLIC PNL TOTAL CA: CPT

## 2022-09-14 PROCEDURE — 2580000003 HC RX 258: Performed by: INTERNAL MEDICINE

## 2022-09-14 PROCEDURE — 96376 TX/PRO/DX INJ SAME DRUG ADON: CPT

## 2022-09-14 RX ORDER — PREDNISONE 20 MG/1
TABLET ORAL
Qty: 10 TABLET | Refills: 0 | Status: SHIPPED | OUTPATIENT
Start: 2022-09-14 | End: 2022-09-15

## 2022-09-14 RX ORDER — SERTRALINE HYDROCHLORIDE 100 MG/1
100 TABLET, FILM COATED ORAL DAILY
COMMUNITY
Start: 2022-09-14

## 2022-09-14 RX ORDER — LEVOFLOXACIN 500 MG/1
500 TABLET, FILM COATED ORAL DAILY
Qty: 5 TABLET | Refills: 0 | Status: SHIPPED | OUTPATIENT
Start: 2022-09-14 | End: 2022-09-19

## 2022-09-14 RX ADMIN — METHYLPREDNISOLONE SODIUM SUCCINATE 40 MG: 40 INJECTION, POWDER, LYOPHILIZED, FOR SOLUTION INTRAMUSCULAR; INTRAVENOUS at 06:43

## 2022-09-14 RX ADMIN — BUDESONIDE 500 MCG: 0.5 SUSPENSION RESPIRATORY (INHALATION) at 09:37

## 2022-09-14 RX ADMIN — IPRATROPIUM BROMIDE AND ALBUTEROL SULFATE 1 AMPULE: .5; 2.5 SOLUTION RESPIRATORY (INHALATION) at 09:37

## 2022-09-14 RX ADMIN — ARFORMOTEROL TARTRATE 15 MCG: 15 SOLUTION RESPIRATORY (INHALATION) at 09:37

## 2022-09-14 RX ADMIN — ATORVASTATIN CALCIUM 20 MG: 20 TABLET, FILM COATED ORAL at 08:13

## 2022-09-14 RX ADMIN — HYDROCHLOROTHIAZIDE 25 MG: 25 TABLET ORAL at 08:13

## 2022-09-14 RX ADMIN — SODIUM CHLORIDE, PRESERVATIVE FREE 10 ML: 5 INJECTION INTRAVENOUS at 08:13

## 2022-09-14 NOTE — DISCHARGE SUMMARY
Physician Discharge Summary     Patient ID:  Martin Clement  [id number]  89 y.o.  1960    Admit date: 9/12/2022    Discharge date and time: No discharge date for patient encounter. Admitting Physician: Katerine Harris MD     Discharge Physician: Katerine Harris    Admission Diagnoses: Pneumonia [J18.9]  Acute respiratory failure with hypoxia ([address] [J96.01]    Discharge Diagnoses:   Atypical PNA  Acute respiratory failure with hypoxia  Hx of DCIS  Depression  Essential Hypertension  Asthma  Hyperlipidemia    Admission Condition: poor    Discharged Condition: good    Indication for Admission:     Hospital Course:   Mrs. Justin Pierce was admitted with dyspnea. Further work up showed PNA. She was started on abx. She was on supplemental oxygen an this was weaned off during hospital stay. Due to recurrent nature of this pna, pulm was consulted. Additional Igg, IGA test ordered. Patient was also treated with solu-medrol and tapering prednisone at discharge. Time spent in discharge of patient is greater than 30 minutes.     Consults: pulmonary/intensive care    Significant Diagnostic Studies: labs:     Treatments: antibiotics: ceftriaxone, azithromycin, and levaquin on discharg    Discharge Exam:  Please see today's Progress note for Physical exam.    Disposition: home    In process/preliminary results:  Outstanding Order Results       Date and Time Order Name Status Description    9/13/2022  8:50 PM IGG 1, 2, 3, AND 4 In process     9/11/2022 10:10 PM Culture, Blood 2 Preliminary     9/11/2022 10:10 PM Culture, Blood 1 Preliminary             Patient Instructions:   Current Discharge Medication List        START taking these medications    Details   levoFLOXacin (LEVAQUIN) 500 MG tablet Take 1 tablet by mouth daily for 5 days  Qty: 5 tablet, Refills: 0      predniSONE (DELTASONE) 20 MG tablet Take 2 tablets by mouth daily for 3 days, THEN 1.5 tablets daily for 3 days, THEN 1 tablet daily for 3 days, THEN 0.5 tablets daily for 3 days. Qty: 10 tablet, Refills: 0           CONTINUE these medications which have CHANGED    Details   sertraline (ZOLOFT) 100 MG tablet Take 1 tablet by mouth daily           CONTINUE these medications which have NOT CHANGED    Details   montelukast (SINGULAIR) 10 MG tablet Take 10 mg by mouth nightly      omeprazole (PRILOSEC) 20 MG delayed release capsule Take 20 mg by mouth daily      hydroCHLOROthiazide (HYDRODIURIL) 25 MG tablet TAKE 1 TABLET BY MOUTH DAILY  Qty: 30 tablet, Refills: 5      atorvastatin (LIPITOR) 20 MG tablet TAKE 1 TABLET BY MOUTH DAILY  Qty: 30 tablet, Refills: 5      budesonide-formoterol (SYMBICORT) 160-4.5 MCG/ACT AERO Inhale 2 puffs into the lungs in the morning and 2 puffs before bedtime.   Qty: 10.2 g, Refills: 3    Associated Diagnoses: Acute exacerbation of extrinsic asthma      albuterol sulfate  (90 Base) MCG/ACT inhaler INHALE 2 PUFFS INTO THE LUNGS EVERY 6 HOURS AS NEEDED FOR WHEEZING  Qty: 6.7 g, Refills: 3      Cholecalciferol (VITAMIN D3) 2000 UNITS CAPS Take 2,000 Units by mouth daily           Activity: activity as tolerated  Diet: cardiac diet  Wound Care: none needed    Follow-up with PCP/Pulm as scheduled    Signed:  Calixto Wilson  9/14/2022  3:05 PM

## 2022-09-14 NOTE — PROGRESS NOTES
Physician Progress Note      Isis Deal  CSN #:                  [id number]  :                       1960  ADMIT DATE:       2022 2:08 AM  DISCH DATE:  RESPONDING  PROVIDER #:        Erik Huertas MD          QUERY TEXT:    Patient admitted with shortness of breath. Noted documentation of acute   respiratory failure in H&P and  IM progress note. In order to support the   diagnosis of acute respiratory failure, please include additional clinical   indicators in your documentation. Or please document if the diagnosis of acute   respiratory failure has been ruled out after further study. The medical record reflects the following:  Risk Factors: PNA  Clinical Indicators: respirations 16-18, 91% 2L, dyspnea with exertion per   nursing, per IM \". [place] Acute respiratory failure with hypoxia: much improved. Continue breathing treatment. Incentive spirometry. Continue solu-medrol. [place] \"  Treatment: O2 therapy    Acute Respiratory Failure Clinical Indicators per [employer] Training Guide and   Quick Reference Guide:  pO2 < 60 mmHg  pCO2 > 50 and pH < 7.35  P/F ratio (pO2 / FIO2) < 300  pO2 decrease or pCO2 increase by 10 mmHg from baseline (if known)  Supplemental oxygen of 40% or more  Presence of respiratory distress, wheezing  Unable to speak in complete sentences  Use of accessory muscles to breathe  Extreme anxiety and feeling of impending doom  Tripod position  Confusion/altered mental status/obtunded    Thank you,  Mary Grace Stafford, RN, BSN, CDIS  Clinical Documentation Improvement  [email or web address]  Options provided:  -- Acute Respiratory Failure as evidenced by, Please document evidence.   -- Acute Respiratory Failure ruled out after study  -- Other - I will add my own diagnosis  -- Disagree - Not applicable / Not valid  -- Disagree - Clinically unable to determine / Unknown  -- Refer to Clinical Documentation Reviewer    PROVIDER RESPONSE TEXT:    This patient is in acute respiratory failure as evidenced by see chart    Query created by: Arvind Goel on 9/14/2022 10:51 AM      Electronically signed by:  Shashi Villafana MD 9/14/2022 12:40 PM

## 2022-09-14 NOTE — PROGRESS NOTES
Pt SpO2 93% at rest on room air. Pt ambulated approximately 100ft down hallway and back. SpO2 dropped to 90-91% on RA. Pt complained of no SOB or discomfort while ambulating.

## 2022-09-14 NOTE — PROGRESS NOTES
Pulmonary Progress Note    Admit Date: 2022  Hospital day                               PCP: Marbin Catalan DO    Chief Complaint (s):  Patient Active Problem List   Diagnosis    DCIS (ductal carcinoma in situ) of breast    Personal history of breast cancer    Primary hypertension    Pure hypercholesterolemia    Pneumonia    Acute respiratory failure with hypoxia ([address]       Subjective:  Patient is seen on room air, walking around room. She feels the cough has decreased. Vitals:  VITALS:  /61   Pulse 77   Temp 98.1 °F (36.7 °C) (Axillary)   Resp 16   Ht 5' 2\" (1.575 m)   Wt 163 lb 1.6 oz (74 kg)   SpO2 95%   BMI 29.83 kg/m²     24HR INTAKE/OUTPUT:    No intake or output data in the 24 hours ending 22 1031    24HR PULSE OXIMETRY RANGE:    SpO2  Av.3 %  Min: 91 %  Max: 96 %    Medications:  IV:   sodium chloride         Scheduled Meds:   atorvastatin  20 mg Oral Daily    hydroCHLOROthiazide  25 mg Oral Daily    montelukast  10 mg Oral Nightly    sertraline  100 mg Oral Daily    sodium chloride flush  5-40 mL IntraVENous 2 times per day    ipratropium-albuterol  1 ampule Inhalation Q4H WA    cefTRIAXone (ROCEPHIN) IV  1,000 mg IntraVENous Q24H    And    azithromycin  500 mg IntraVENous Q24H    methylPREDNISolone  40 mg IntraVENous Q8H    Arformoterol Tartrate  15 mcg Nebulization BID    And    budesonide  0.5 mg Nebulization BID       Diet:   ADULT DIET; Regular; Low Fat/Low Chol/High Fiber/2 gm Na     EXAM:  General: No distress. Alert. Eyes: PERRL. No sclera icterus. No conjunctival injection. ENT: No discharge. Pharynx clear. Neck: Trachea midline. Normal thyroid. Resp: No accessory muscle use. no rales. bilateral wheezing. no rhonchi. CV: Regular rate. Regular rhythm. No murmur or rub. Abd: Non-tender. Non-distended. No masses. No organomegaly. Normal bowel sounds. Skin: Warm and dry. No nodule on exposed extremities.  No rash on exposed extremities. Ext: No cyanosis, clubbing, edema  Lymph: No cervical LAD. No supraclavicular LAD. M/S: No cyanosis. No joint deformity. No clubbing. Neuro: Awake. Follows commands. Positive pupils/gag/corneals. Normal pain response. Results:  CBC:   Recent Labs     09/11/22 1913 09/13/22  0655 09/14/22  0530   WBC 12.1* 21.5* 20.0*   HGB 14.6 13.8 14.0   HCT 43.7 41.5 43.5   MCV 84.2 85.9 87.7    363 362     BMP:   Recent Labs     09/11/22 1913 09/13/22  0655 09/14/22  0530    140 139   K 3.5 3.4* 3.7   CL 98 100 98   CO2 30* 29 28   BUN 14 17 19   CREATININE 0.7 0.7 0.6     LIVER PROFILE:   Recent Labs     09/11/22 1913   AST 12   ALT 12   BILITOT 0.5   ALKPHOS 82     PT/INR: No results for input(s): PROTIME, INR in the last 72 hours. APTT: No results for input(s): APTT in the last 72 hours. Pathology:  N/A      Microbiology:  None    Recent ABG:   No results for input(s): PH, PO2, PCO2, HCO3, BE, O2SAT, METHB, O2HB, COHB, O2CON, HHB, THB in the last 72 hours. Recent Films:  No orders to display       Assessment:  Recurrent pneumonia: Unclear etiology. The different location suggest this is not anatomic in origin. Underlying immunodeficiency is a consideration. Plan:  Await blood work    Time at the bedside, reviewing labs and radiographs, reviewing updated notes and consultations, discussing with staff and family was more than 35 minutes. Please note that voice recognition technology was used in the preparation of this note and make therefore it may contain inadvertent transcription errors. If the patient is a COVID 19 isolation patient, the above physical exam reflects that of the examining physician for the day.         MELANIE Chappell - NP    [facility]    [facility],  [facility], [address] [place] [address]  Office visits:  BERNARDO Medina [facility]

## 2022-09-14 NOTE — PLAN OF CARE
Problem: Safety - Adult  Goal: Free from fall injury  Outcome: Progressing  Flowsheets (Taken 9/13/2022 0497)  Free From Fall Injury: Instruct family/caregiver on patient safety     Problem: Discharge Planning  Goal: Discharge to home or other facility with appropriate resources  Outcome: Progressing

## 2022-09-14 NOTE — PROGRESS NOTES
Department of Internal Medicine  General Internal Medicine  Attending Progress Note  CC: cough and dyspnea    SUBJECTIVE:    Reports that she is doing well. She is no longer on oxygen. Aware of possible plans to discharge her if symptoms continues to improve and this is okay with Pulm team.  She reported upcoming cataract surgery on the 29th of sept and if she can still proceed with surgery. As long as there is no exacerbation of symptoms, there is no overt need to delay surgery.   She also asked about air travel and was asked to clear this with her pulmonologist.      OBJECTIVE      Medications    Current Facility-Administered Medications: atorvastatin (LIPITOR) tablet 20 mg, 20 mg, Oral, Daily  hydroCHLOROthiazide (HYDRODIURIL) tablet 25 mg, 25 mg, Oral, Daily  montelukast (SINGULAIR) tablet 10 mg, 10 mg, Oral, Nightly  sertraline (ZOLOFT) tablet 100 mg, 100 mg, Oral, Daily  sodium chloride flush 0.9 % injection 5-40 mL, 5-40 mL, IntraVENous, 2 times per day  sodium chloride flush 0.9 % injection 5-40 mL, 5-40 mL, IntraVENous, PRN  0.9 % sodium chloride infusion, , IntraVENous, PRN  ondansetron (ZOFRAN-ODT) disintegrating tablet 4 mg, 4 mg, Oral, Q8H PRN **OR** ondansetron (ZOFRAN) injection 4 mg, 4 mg, IntraVENous, Q6H PRN  polyethylene glycol (GLYCOLAX) packet 17 g, 17 g, Oral, Daily PRN  acetaminophen (TYLENOL) tablet 650 mg, 650 mg, Oral, Q6H PRN **OR** acetaminophen (TYLENOL) suppository 650 mg, 650 mg, Rectal, Q6H PRN  ipratropium-albuterol (DUONEB) nebulizer solution 1 ampule, 1 ampule, Inhalation, Q4H WA  cefTRIAXone (ROCEPHIN) 1,000 mg in sterile water 10 mL IV syringe, 1,000 mg, IntraVENous, Q24H **AND** azithromycin (ZITHROMAX) 500 mg in D5W 250ml addavial, 500 mg, IntraVENous, Q24H  methylPREDNISolone sodium (SOLU-MEDROL) injection 40 mg, 40 mg, IntraVENous, Q8H  Arformoterol Tartrate (BROVANA) nebulizer solution 15 mcg, 15 mcg, Nebulization, BID **AND** budesonide (PULMICORT) nebulizer suspension 500 mcg, 0.5 mg, Nebulization, BID  Physical    VITALS:  /61   Pulse 94   Temp 98.1 °F (36.7 °C) (Axillary)   Resp 16   Ht 5' 2\" (1.575 m)   Wt 163 lb 1.6 oz (74 kg)   SpO2 95%   BMI 29.83 kg/m²   CONSTITUTIONAL:  awake, cooperative, and no apparent distress  EYES:  extra-ocular muscles intact  ENT:  normocepalic, without obvious abnormality  NECK:  supple, symmetrical, trachea midline  LUNGS:  no increased work of breathing, no retractions, and clear to auscultation  CARDIOVASCULAR:  normal apical pulses and normal S1 and S2  ABDOMEN:  normal bowel sounds, non-distended, and non-tender  MUSCULOSKELETAL:  there is no redness, warmth, or swelling of the joints  NEUROLOGIC:  Mental Status Exam:  Level of Alertness:   awake  Orientation:   person, place, time  SKIN:  no bruising or bleeding  Data    CBC:   Lab Results   Component Value Date/Time    WBC 20.0 09/14/2022 05:30 AM    RBC 4.96 09/14/2022 05:30 AM    HGB 14.0 09/14/2022 05:30 AM    HCT 43.5 09/14/2022 05:30 AM    MCV 87.7 09/14/2022 05:30 AM    MCH 28.2 09/14/2022 05:30 AM    MCHC 32.2 09/14/2022 05:30 AM    RDW 13.7 09/14/2022 05:30 AM     09/14/2022 05:30 AM    MPV 8.8 09/14/2022 05:30 AM     BMP:    Lab Results   Component Value Date/Time     09/14/2022 05:30 AM    K 3.7 09/14/2022 05:30 AM    K 3.4 09/13/2022 06:55 AM    CL 98 09/14/2022 05:30 AM    CO2 28 09/14/2022 05:30 AM    BUN 19 09/14/2022 05:30 AM    LABALBU 4.2 09/11/2022 07:13 PM    CREATININE 0.6 09/14/2022 05:30 AM    CALCIUM 9.8 09/14/2022 05:30 AM    GFRAA >60 09/14/2022 05:30 AM    LABGLOM >60 09/14/2022 05:30 AM    GLUCOSE 130 09/14/2022 05:30 AM       ASSESSMENT AND PLAN    Mrs. Toy Adams was admitted with cough and Hypoxia. Further work up showed PNA. She was treated with azithromax and ceftriaxone and solu-medrol. Symptoms has significantly improved since admission. 1.  Pneumonia, Atypical:  Continue current abx.  Symptoms has improved greatly  Continue to monitor. Possible switch abx to P.O and discharge later today if cleared by pulm. 2.  Acute respiratory failure with hypoxia: resolved. Currently on room air. Continue breathing treatment and steroid as ordered. Continue incentive spirometry. Check ambulatory pulse ox again before discharge. 3.  Hypertension Essential: continue home meds. BP is well controlled    Okay to discontinue tele. 4.  ?Bronchitis/asthma:  breathing treatment as ordered    5. Hyperlipidemia: on statin. Discharge possibly today if cleared by Pulm.

## 2022-09-15 ENCOUNTER — TELEPHONE (OUTPATIENT)
Dept: PRIMARY CARE CLINIC | Age: 62
End: 2022-09-15

## 2022-09-15 NOTE — TELEPHONE ENCOUNTER
[facility] Initial Follow Up Call    Outreach made within 2 business days of discharge: Yes    Patient: Domenico Ramos Patient : 1960   MRN: [id number]  Reason for Admission: Pneumonia  Discharge Date: 22       Spoke with: Left message to return call back.     Discharge department/facility: [facility]    Scheduled appointment with PCP within 7-14 days    Follow Up  Future Appointments   Date Time Provider Ko Romano   2023  8:45 AM Bigg Torres DO [id number] [place], [place]

## 2022-09-16 ENCOUNTER — TELEPHONE (OUTPATIENT)
Dept: PRIMARY CARE CLINIC | Age: 62
End: 2022-09-16

## 2022-09-16 LAB
IGG 1: 572 MG/DL (ref 240–1118)
IGG 2: 184 MG/DL (ref 124–549)
IGG 3: 4 MG/DL (ref 21–134)
IGG 4: 29 MG/DL (ref 1–123)

## 2022-09-16 NOTE — TELEPHONE ENCOUNTER
[facility] Trans[facility] Initial Follow Up Call    Outreach made within 2 business days of discharge: Yes    Patient: Shayy Medina Patient : 1960   MRN: [id number]  Reason for Admission: Pneumonia  Discharge Date: 22       Spoke with: Dejon Lara     Discharge department/facility: [facility] Patient Contact:  Was patient able to fill all prescriptions: Yes  Was patient instructed to bring all medications to the follow-up visit: Yes  Is patient taking all medications as directed in the discharge summary? Yes  Does patient understand their discharge instructions: Yes  Does patient have questions or concerns that need addressed prior to 7-14 day follow up office visit: no    Scheduled appointment with PCP within 7-14 days  Patient states that she will follow up with Dr. Yahir Epperson and will call PCP as needed.     Follow Up  Future Appointments   Date Time Provider Ko Romano   2023  8:45 AM Rowdy Monk DO [facility] [place], [place]

## 2022-09-17 LAB
BLOOD CULTURE, ROUTINE: NORMAL
CULTURE, BLOOD 2: NORMAL

## 2022-10-12 RX ORDER — MONTELUKAST SODIUM 10 MG/1
TABLET ORAL
Qty: 30 TABLET | Refills: 5 | Status: SHIPPED | OUTPATIENT
Start: 2022-10-12

## 2022-11-07 ENCOUNTER — OFFICE VISIT (OUTPATIENT)
Dept: PRIMARY CARE CLINIC | Age: 62
End: 2022-11-07
Payer: COMMERCIAL

## 2022-11-07 ENCOUNTER — TELEPHONE (OUTPATIENT)
Dept: PRIMARY CARE CLINIC | Age: 62
End: 2022-11-07

## 2022-11-07 VITALS
RESPIRATION RATE: 14 BRPM | TEMPERATURE: 98.1 F | OXYGEN SATURATION: 96 % | BODY MASS INDEX: 31.1 KG/M2 | WEIGHT: 169 LBS | SYSTOLIC BLOOD PRESSURE: 110 MMHG | DIASTOLIC BLOOD PRESSURE: 78 MMHG | HEART RATE: 66 BPM | HEIGHT: 62 IN

## 2022-11-07 DIAGNOSIS — J40 BRONCHITIS: Primary | ICD-10-CM

## 2022-11-07 DIAGNOSIS — D80.3 IGG3 DEFICIENCY (HCC): ICD-10-CM

## 2022-11-07 PROCEDURE — G8482 FLU IMMUNIZE ORDER/ADMIN: HCPCS | Performed by: FAMILY MEDICINE

## 2022-11-07 PROCEDURE — 3078F DIAST BP <80 MM HG: CPT | Performed by: FAMILY MEDICINE

## 2022-11-07 PROCEDURE — G8427 DOCREV CUR MEDS BY ELIG CLIN: HCPCS | Performed by: FAMILY MEDICINE

## 2022-11-07 PROCEDURE — G8419 CALC BMI OUT NRM PARAM NOF/U: HCPCS | Performed by: FAMILY MEDICINE

## 2022-11-07 PROCEDURE — 99213 OFFICE O/P EST LOW 20 MIN: CPT | Performed by: FAMILY MEDICINE

## 2022-11-07 PROCEDURE — 3017F COLORECTAL CA SCREEN DOC REV: CPT | Performed by: FAMILY MEDICINE

## 2022-11-07 PROCEDURE — 1036F TOBACCO NON-USER: CPT | Performed by: FAMILY MEDICINE

## 2022-11-07 PROCEDURE — 3074F SYST BP LT 130 MM HG: CPT | Performed by: FAMILY MEDICINE

## 2022-11-07 RX ORDER — LEVOFLOXACIN 500 MG/1
500 TABLET, FILM COATED ORAL DAILY
Qty: 10 TABLET | Refills: 0 | Status: SHIPPED | OUTPATIENT
Start: 2022-11-07 | End: 2022-11-17

## 2022-11-07 ASSESSMENT — ENCOUNTER SYMPTOMS
WHEEZING: 1
SORE THROAT: 0
SHORTNESS OF BREATH: 1
COUGH: 1
RHINORRHEA: 0
SINUS PRESSURE: 0
SINUS PAIN: 0

## 2022-11-07 NOTE — TELEPHONE ENCOUNTER
Pt called explaining she thinks she may have pneumonia. Symptoms are a cough, SOB. Waiting to hear to get into the [facility] about her low IGG. Pt is using her alburtol inhaler and symborcort. Pt would like to know if you want to see her in the office.

## 2022-11-07 NOTE — PROGRESS NOTES
Maria Luz Skinner, a female of 57 y.o. came to the office 11/7/2022. Patient Active Problem List   Diagnosis    DCIS (ductal carcinoma in situ) of breast    Personal history of breast cancer    Primary hypertension    Pure hypercholesterolemia    Pneumonia    Acute respiratory failure with hypoxia (HCC)          URI   Associated symptoms include coughing and wheezing (past sev days getting worse. ). Pertinent negatives include no ear pain, headaches, rhinorrhea, sinus pain or sore throat. Cough  This is a new problem. The current episode started in the past 7 days. The problem has been gradually worsening. The cough is Productive of purulent sputum (dry at times and whith whitish mucus polly like. ). Associated symptoms include shortness of breath and wheezing (past sev days getting worse. ). Pertinent negatives include no chills, ear pain, fever, headaches, nasal congestion, postnasal drip, rhinorrhea or sore throat. She has tried steroid inhaler and a beta-agonist inhaler for the symptoms. The treatment provided mild relief. Her past medical history is significant for pneumonia (9/2022). Allergies   Allergen Reactions    Elemental Sulfur Anaphylaxis and Other (See Comments)     Sores in mouth, throat closed    Doxycycline     Sulfa Antibiotics        Current Outpatient Medications on File Prior to Visit   Medication Sig Dispense Refill    montelukast (SINGULAIR) 10 MG tablet TAKE 1 TABLET BY MOUTH EVERY NIGHT 30 tablet 5    sertraline (ZOLOFT) 100 MG tablet Take 1 tablet by mouth daily      omeprazole (PRILOSEC) 20 MG delayed release capsule Take 20 mg by mouth daily      hydroCHLOROthiazide (HYDRODIURIL) 25 MG tablet TAKE 1 TABLET BY MOUTH DAILY 30 tablet 5    atorvastatin (LIPITOR) 20 MG tablet TAKE 1 TABLET BY MOUTH DAILY 30 tablet 5    budesonide-formoterol (SYMBICORT) 160-4.5 MCG/ACT AERO Inhale 2 puffs into the lungs in the morning and 2 puffs before bedtime.  10.2 g 3    albuterol sulfate  (90 Base) MCG/ACT inhaler INHALE 2 PUFFS INTO THE LUNGS EVERY 6 HOURS AS NEEDED FOR WHEEZING 6.7 g 3    Cholecalciferol (VITAMIN D3) 2000 UNITS CAPS Take 2,000 Units by mouth daily      predniSONE (DELTASONE) 20 MG tablet Take one tablet for 3 X 3 days then 1/2 tablet for 6 days (Patient not taking: No sig reported) 6 tablet 0     No current facility-administered medications on file prior to visit. Review of Systems   Constitutional:  Negative for chills and fever. HENT:  Negative for ear pain, postnasal drip, rhinorrhea, sinus pressure, sinus pain and sore throat. Respiratory:  Positive for cough, shortness of breath and wheezing (past sev days getting worse. ). Neurological:  Negative for headaches. other review of systems reviewed and are negative    OBJECTIVE:  /78   Pulse 66   Temp 98.1 °F (36.7 °C)   Resp 14   Ht 5' 2\" (1.575 m)   Wt 169 lb (76.7 kg)   SpO2 96%   BMI 30.91 kg/m²      Physical Exam  Constitutional:       General: She is not in acute distress. HENT:      Right Ear: Tympanic membrane normal.      Left Ear: Tympanic membrane normal.      Nose: No mucosal edema or rhinorrhea. Right Sinus: No maxillary sinus tenderness or frontal sinus tenderness. Left Sinus: No maxillary sinus tenderness or frontal sinus tenderness. Mouth/Throat:      Pharynx: Uvula midline. No oropharyngeal exudate or posterior oropharyngeal erythema. Cardiovascular:      Rate and Rhythm: Normal rate and regular rhythm. Pulmonary:      Effort: Pulmonary effort is normal.      Breath sounds: Examination of the right-upper field reveals wheezing. Examination of the left-upper field reveals wheezing. Examination of the right-middle field reveals wheezing. Examination of the left-middle field reveals wheezing. Examination of the right-lower field reveals wheezing. Examination of the left-lower field reveals wheezing. Wheezing (faint with inspir and expiration.) present.    Musculoskeletal: Cervical back: Neck supple. Lymphadenopathy:      Cervical: No cervical adenopathy. ASSESSMENT AND PLAN:    Zach Jamison was seen today for uri, cough and congestion. Diagnoses and all orders for this visit:    Bronchitis  -     levoFLOXacin (LEVAQUIN) 500 MG tablet; Take 1 tablet by mouth daily for 10 days    IgG3 deficiency ([facility] [address]  - follow up with hematology regarding ov and labs from 1 wk ago. Note reviewed. Return if symptoms worsen or fail to improve.     Denisa Mcdaniels, DO

## 2022-11-11 RX ORDER — SERTRALINE HYDROCHLORIDE 100 MG/1
TABLET, FILM COATED ORAL
Qty: 45 TABLET | Refills: 1 | Status: SHIPPED | OUTPATIENT
Start: 2022-11-11

## 2022-12-02 ENCOUNTER — OFFICE VISIT (OUTPATIENT)
Dept: PRIMARY CARE CLINIC | Age: 62
End: 2022-12-02
Payer: COMMERCIAL

## 2022-12-02 VITALS
SYSTOLIC BLOOD PRESSURE: 112 MMHG | BODY MASS INDEX: 30.36 KG/M2 | DIASTOLIC BLOOD PRESSURE: 82 MMHG | HEART RATE: 78 BPM | OXYGEN SATURATION: 97 % | WEIGHT: 166 LBS | TEMPERATURE: 97.2 F

## 2022-12-02 DIAGNOSIS — J45.42 MODERATE PERSISTENT ASTHMA WITH STATUS ASTHMATICUS: Primary | ICD-10-CM

## 2022-12-02 DIAGNOSIS — D80.3 IGG3 DEFICIENCY (HCC): ICD-10-CM

## 2022-12-02 PROBLEM — J45.909 ASTHMA: Status: ACTIVE | Noted: 2022-12-02

## 2022-12-02 PROCEDURE — G8427 DOCREV CUR MEDS BY ELIG CLIN: HCPCS | Performed by: FAMILY MEDICINE

## 2022-12-02 PROCEDURE — 1036F TOBACCO NON-USER: CPT | Performed by: FAMILY MEDICINE

## 2022-12-02 PROCEDURE — 99214 OFFICE O/P EST MOD 30 MIN: CPT | Performed by: FAMILY MEDICINE

## 2022-12-02 PROCEDURE — 3074F SYST BP LT 130 MM HG: CPT | Performed by: FAMILY MEDICINE

## 2022-12-02 PROCEDURE — G8417 CALC BMI ABV UP PARAM F/U: HCPCS | Performed by: FAMILY MEDICINE

## 2022-12-02 PROCEDURE — 3078F DIAST BP <80 MM HG: CPT | Performed by: FAMILY MEDICINE

## 2022-12-02 PROCEDURE — 3017F COLORECTAL CA SCREEN DOC REV: CPT | Performed by: FAMILY MEDICINE

## 2022-12-02 PROCEDURE — G8482 FLU IMMUNIZE ORDER/ADMIN: HCPCS | Performed by: FAMILY MEDICINE

## 2022-12-02 RX ORDER — TIOTROPIUM BROMIDE INHALATION SPRAY 1.56 UG/1
2 SPRAY, METERED RESPIRATORY (INHALATION) DAILY
Qty: 2 EACH | Refills: 0 | COMMUNITY
Start: 2022-12-02

## 2022-12-02 RX ORDER — PREDNISONE 10 MG/1
TABLET ORAL
Qty: 21 TABLET | Refills: 0 | Status: SHIPPED | OUTPATIENT
Start: 2022-12-02

## 2022-12-02 ASSESSMENT — ENCOUNTER SYMPTOMS
SPUTUM PRODUCTION: 1
COUGH: 1
CHEST TIGHTNESS: 0
SHORTNESS OF BREATH: 1
WHEEZING: 1

## 2022-12-02 NOTE — PROGRESS NOTES
Verena Clark, a female of 57 y.o. came to the office 12/2/2022. Patient Active Problem List   Diagnosis    DCIS (ductal carcinoma in situ) of breast    Personal history of breast cancer    Primary hypertension    Pure hypercholesterolemia    Pneumonia    Acute respiratory failure with hypoxia ([facility]    Asthma    IgG3 deficiency ([facility]          Asthma  She complains of cough, shortness of breath, sputum production and wheezing. There is no chest tightness. This is a chronic problem. The cough is productive of purulent sputum. Associated symptoms include malaise/fatigue. Pertinent negatives include no chest pain or fever. Her symptoms are aggravated by exercise. Her symptoms are alleviated by steroid inhaler, beta-agonist and leukotriene antagonist (started on Spiriva 1.24). Her past medical history is significant for asthma. Cough  This is a chronic problem. Associated symptoms include shortness of breath and wheezing. Pertinent negatives include no chest pain, chills or fever. Her past medical history is significant for asthma. Shortness of Breath  Associated symptoms include sputum production and wheezing. Pertinent negatives include no chest pain or fever. Her past medical history is significant for asthma. IGG 3 deficiency: to start IvG infusions in future via [facility].  Appt with Dr. Gerardo Bey Anaphylaxis and Other (See Comments)     Sores in mouth, throat closed    Doxycycline     Sulfa Antibiotics        Current Outpatient Medications on File Prior to Visit   Medication Sig Dispense Refill    sertraline (ZOLOFT) 100 MG tablet TAKE 1 AND 1/2 TABLETS BY MOUTH DAILY 45 tablet 1    montelukast (SINGULAIR) 10 MG tablet TAKE 1 TABLET BY MOUTH EVERY NIGHT 30 tablet 5    omeprazole (PRILOSEC) 20 MG delayed release capsule Take 20 mg by mouth daily      hydroCHLOROthiazide (HYDRODIURIL) 25 MG tablet TAKE 1 TABLET BY MOUTH DAILY 30 tablet 5 atorvastatin (LIPITOR) 20 MG tablet TAKE 1 TABLET BY MOUTH DAILY 30 tablet 5    budesonide-formoterol (SYMBICORT) 160-4.5 MCG/ACT AERO Inhale 2 puffs into the lungs in the morning and 2 puffs before bedtime. 10.2 g 3    albuterol sulfate  (90 Base) MCG/ACT inhaler INHALE 2 PUFFS INTO THE LUNGS EVERY 6 HOURS AS NEEDED FOR WHEEZING 6.7 g 3    Cholecalciferol (VITAMIN D3) 2000 UNITS CAPS Take 2,000 Units by mouth daily       No current facility-administered medications on file prior to visit. Review of Systems   Constitutional:  Positive for fatigue and malaise/fatigue. Negative for chills and fever. Respiratory:  Positive for cough, sputum production, shortness of breath and wheezing. Cardiovascular:  Negative for chest pain and palpitations. other review of systems reviewed and are negative    OBJECTIVE:  /82   Pulse 78   Temp 97.2 °F (36.2 °C)   Wt 166 lb (75.3 kg)   SpO2 97%   BMI 30.36 kg/m²      Physical Exam  Vitals reviewed. Eyes:      General: No scleral icterus. Conjunctiva/sclera: Conjunctivae normal.   Neck:      Thyroid: No thyromegaly. Vascular: No carotid bruit. Cardiovascular:      Rate and Rhythm: Normal rate and regular rhythm. Heart sounds: No murmur heard. Pulmonary:      Effort: Pulmonary effort is normal.      Breath sounds: Wheezing present. No rales. Comments: All fields expir> inspiratory. Musculoskeletal:         General: Normal range of motion. Cervical back: Neck supple. Lymphadenopathy:      Cervical: No cervical adenopathy. Skin:     General: Skin is warm and dry. Neurological:      Mental Status: She is alert and oriented to person, place, and time. ASSESSMENT AND PLAN:    Clementine Blum was seen today for asthma, cough and shortness of breath. Diagnoses and all orders for this visit:    Moderate persistent asthma with status asthmaticus  -     tiotropium (SPIRIVA RESPIMAT) 1.25 MCG/ACT AERS inhaler;  Inhale 2 puffs into the lungs daily  -     predniSONE (DELTASONE) 10 MG tablet; 4 daily for 2 days, then 3 daily for 2 days, then 2 daily for 2 days, then 1 daily for 2 days, then 1/2 daily for 2days. IgG3 deficiency ([facility] [address]    - continue symbicort bid, Albuterol mdi prn   - follow up with heme onc  for IG infusions and pulm  - consider antibiotic if still coughing up colored mucus in several days. Return if symptoms worsen or fail to improve.     Mehnaz Mcdaniels, DO

## 2023-01-05 ENCOUNTER — OFFICE VISIT (OUTPATIENT)
Dept: PRIMARY CARE CLINIC | Age: 63
End: 2023-01-05
Payer: COMMERCIAL

## 2023-01-05 VITALS
TEMPERATURE: 97.3 F | WEIGHT: 168 LBS | OXYGEN SATURATION: 96 % | BODY MASS INDEX: 30.73 KG/M2 | DIASTOLIC BLOOD PRESSURE: 84 MMHG | HEART RATE: 86 BPM | SYSTOLIC BLOOD PRESSURE: 132 MMHG

## 2023-01-05 DIAGNOSIS — R05.8 COUGH PRODUCTIVE OF PURULENT SPUTUM: ICD-10-CM

## 2023-01-05 DIAGNOSIS — D80.3 IGG3 DEFICIENCY (HCC): ICD-10-CM

## 2023-01-05 DIAGNOSIS — J45.42 MODERATE PERSISTENT ASTHMA WITH STATUS ASTHMATICUS: Primary | ICD-10-CM

## 2023-01-05 PROCEDURE — 99213 OFFICE O/P EST LOW 20 MIN: CPT | Performed by: FAMILY MEDICINE

## 2023-01-05 PROCEDURE — 3075F SYST BP GE 130 - 139MM HG: CPT | Performed by: FAMILY MEDICINE

## 2023-01-05 PROCEDURE — 3079F DIAST BP 80-89 MM HG: CPT | Performed by: FAMILY MEDICINE

## 2023-01-05 RX ORDER — PREDNISONE 10 MG/1
TABLET ORAL
Qty: 21 TABLET | Refills: 0 | Status: SHIPPED | OUTPATIENT
Start: 2023-01-05

## 2023-01-05 ASSESSMENT — ENCOUNTER SYMPTOMS
CONSTIPATION: 0
COLOR CHANGE: 0
EYES NEGATIVE: 1
CHEST TIGHTNESS: 0
BACK PAIN: 0
GASTROINTESTINAL NEGATIVE: 1
DIARRHEA: 0
NAUSEA: 0
PHOTOPHOBIA: 0

## 2023-01-05 ASSESSMENT — PATIENT HEALTH QUESTIONNAIRE - PHQ9
1. LITTLE INTEREST OR PLEASURE IN DOING THINGS: 0
SUM OF ALL RESPONSES TO PHQ QUESTIONS 1-9: 0
2. FEELING DOWN, DEPRESSED OR HOPELESS: 0
SUM OF ALL RESPONSES TO PHQ QUESTIONS 1-9: 0
SUM OF ALL RESPONSES TO PHQ9 QUESTIONS 1 & 2: 0
SUM OF ALL RESPONSES TO PHQ QUESTIONS 1-9: 0
SUM OF ALL RESPONSES TO PHQ QUESTIONS 1-9: 0

## 2023-01-05 NOTE — PROGRESS NOTES
Tara Jeffrey, a female of 60 y.o. came to the office 1/5/2023. Patient Active Problem List   Diagnosis    DCIS (ductal carcinoma in situ) of breast    Personal history of breast cancer    Primary hypertension    Pure hypercholesterolemia    Pneumonia    Acute respiratory failure with hypoxia (HCC)    Asthma    IgG3 deficiency (HCC)          Cough  This is a chronic problem. Pertinent negatives include no chills, ear congestion, nasal congestion or postnasal drip. Her past medical history is significant for asthma. Wheezing   Pertinent negatives include no chills or diarrhea. Treatments tried: Albuterol, Spiriva, and symbicort. The treatment provided moderate relief. Her past medical history is significant for asthma. Asthma  There is no chest tightness. This is a chronic problem. The cough is productive of purulent sputum. Associated symptoms include sneezing (occ). Pertinent negatives include no appetite change, ear congestion, malaise/fatigue, nasal congestion or postnasal drip. Her symptoms are aggravated by exercise. Her symptoms are alleviated by steroid inhaler and beta-agonist (started on Spiriva 1.24). Her past medical history is significant for asthma. IGG 3 deficiency: to start IvG infusions in future via [facility].  She is currently following Sol Morales ( an immunologist) and  Dr. Ruthy Anaya ( pulmonologist)      Allergies   Allergen Reactions    Elemental Sulfur Anaphylaxis and Other (See Comments)     Sores in mouth, throat closed    Doxycycline     Sulfa Antibiotics        Current Outpatient Medications on File Prior to Visit   Medication Sig Dispense Refill    tiotropium (SPIRIVA RESPIMAT) 1.25 MCG/ACT AERS inhaler Inhale 2 puffs into the lungs daily 2 each 0    sertraline (ZOLOFT) 100 MG tablet TAKE 1 AND 1/2 TABLETS BY MOUTH DAILY 45 tablet 1    montelukast (SINGULAIR) 10 MG tablet TAKE 1 TABLET BY MOUTH EVERY NIGHT 30 tablet 5    omeprazole (PRILOSEC) 20 MG delayed release capsule Take 20 mg by mouth daily      hydroCHLOROthiazide (HYDRODIURIL) 25 MG tablet TAKE 1 TABLET BY MOUTH DAILY 30 tablet 5    atorvastatin (LIPITOR) 20 MG tablet TAKE 1 TABLET BY MOUTH DAILY 30 tablet 5    budesonide-formoterol (SYMBICORT) 160-4.5 MCG/ACT AERO Inhale 2 puffs into the lungs in the morning and 2 puffs before bedtime. 10.2 g 3    albuterol sulfate  (90 Base) MCG/ACT inhaler INHALE 2 PUFFS INTO THE LUNGS EVERY 6 HOURS AS NEEDED FOR WHEEZING 6.7 g 3    Cholecalciferol (VITAMIN D3) 2000 UNITS CAPS Take 2,000 Units by mouth daily       No current facility-administered medications on file prior to visit. Review of Systems   Constitutional: Negative. Negative for appetite change, chills, fatigue and malaise/fatigue. HENT:  Positive for sneezing (occ). Negative for congestion, hearing loss and postnasal drip. Eyes: Negative. Negative for photophobia and visual disturbance. Cardiovascular: Negative. Gastrointestinal: Negative. Negative for constipation, diarrhea and nausea. Endocrine: Negative. Negative for polydipsia, polyphagia and polyuria. Genitourinary:  Negative for dyspareunia, frequency, hematuria and urgency. Musculoskeletal:  Negative for back pain, gait problem and joint swelling. Skin: Negative. Negative for color change, pallor and wound. Neurological: Negative. Negative for dizziness and light-headedness. Psychiatric/Behavioral:  Negative for agitation, dysphoric mood and sleep disturbance. other review of systems reviewed and are negative    OBJECTIVE:  /84   Pulse 86   Temp 97.3 °F (36.3 °C)   Wt 168 lb (76.2 kg)   SpO2 96%   BMI 30.73 kg/m²      Physical Exam  Constitutional:       Appearance: Normal appearance. She is normal weight. HENT:      Head: Normocephalic and atraumatic. Nose: No nasal tenderness, congestion or rhinorrhea.       Comments: Bilateral erythematous turbinates      Mouth/Throat:      Mouth: Mucous membranes are moist.      Pharynx: Oropharynx is clear. No oropharyngeal exudate or posterior oropharyngeal erythema. Eyes:      Extraocular Movements: Extraocular movements intact. Conjunctiva/sclera: Conjunctivae normal.      Pupils: Pupils are equal, round, and reactive to light. Cardiovascular:      Rate and Rhythm: Normal rate and regular rhythm. Pulses: Normal pulses. Heart sounds: Normal heart sounds. No murmur heard. No friction rub. No gallop. Pulmonary:      Effort: Pulmonary effort is normal. No respiratory distress. Breath sounds: No stridor. Wheezing present. No rales. Comments: Diffuse wheezing bilaterally   Abdominal:      General: Abdomen is flat. Bowel sounds are normal. There is no distension. Palpations: Abdomen is soft. Tenderness: There is no abdominal tenderness. There is no guarding. Musculoskeletal:         General: No swelling, tenderness or deformity. Normal range of motion. Cervical back: Normal range of motion and neck supple. No rigidity. Neurological:      General: No focal deficit present. Mental Status: She is alert and oriented to person, place, and time. Mental status is at baseline. ASSESSMENT AND PLAN:    Dejon Lara was seen today for cough and wheezing. Diagnoses and all orders for this visit:    Moderate persistent asthma with status asthmaticus  -     predniSONE (DELTASONE) 10 MG tablet; 4 daily for 2 days, then 3 daily for 2 days, then 2 daily for 2 days, then 1 daily for 2 days, then 1/2 daily for 2days. Cough productive of purulent sputum    IgG3 deficiency (HCC)    -Start prednisone 10 mg   - continue all other inhaler treatments  - f/u with allergy and heme.   -Continue diet and exercise   -Increase fluid intake to ensure adequate hydration   -She is due for annual exam on 1-26-23 we will do labs then    I reviewed the students documentation ( Hx, exam, MDM ), examined the patient and performed the A&P.     Return for as scheduled.     Tamanna Mcdaniels, DO

## 2023-01-10 RX ORDER — SERTRALINE HYDROCHLORIDE 100 MG/1
TABLET, FILM COATED ORAL
Qty: 45 TABLET | Refills: 5 | Status: SHIPPED | OUTPATIENT
Start: 2023-01-10

## 2023-01-17 RX ORDER — ALBUTEROL SULFATE 90 UG/1
2 AEROSOL, METERED RESPIRATORY (INHALATION) EVERY 6 HOURS PRN
Qty: 6.7 G | Refills: 3 | Status: SHIPPED | OUTPATIENT
Start: 2023-01-17

## 2023-01-26 ENCOUNTER — OFFICE VISIT (OUTPATIENT)
Dept: PRIMARY CARE CLINIC | Age: 63
End: 2023-01-26
Payer: COMMERCIAL

## 2023-01-26 VITALS
OXYGEN SATURATION: 96 % | WEIGHT: 171 LBS | SYSTOLIC BLOOD PRESSURE: 128 MMHG | HEART RATE: 70 BPM | TEMPERATURE: 97.4 F | DIASTOLIC BLOOD PRESSURE: 84 MMHG | BODY MASS INDEX: 31.28 KG/M2

## 2023-01-26 DIAGNOSIS — E78.00 PURE HYPERCHOLESTEROLEMIA: Primary | ICD-10-CM

## 2023-01-26 DIAGNOSIS — D80.3 IGG3 DEFICIENCY (HCC): ICD-10-CM

## 2023-01-26 DIAGNOSIS — F41.1 GAD (GENERALIZED ANXIETY DISORDER): ICD-10-CM

## 2023-01-26 PROCEDURE — 3074F SYST BP LT 130 MM HG: CPT | Performed by: FAMILY MEDICINE

## 2023-01-26 PROCEDURE — 3079F DIAST BP 80-89 MM HG: CPT | Performed by: FAMILY MEDICINE

## 2023-01-26 PROCEDURE — 99214 OFFICE O/P EST MOD 30 MIN: CPT | Performed by: FAMILY MEDICINE

## 2023-01-26 ASSESSMENT — ENCOUNTER SYMPTOMS
WHEEZING: 1
SHORTNESS OF BREATH: 0

## 2023-01-26 NOTE — PROGRESS NOTES
Madan Maldonado, a female of 60 y.o. came to the office 1/26/2023. Patient Active Problem List   Diagnosis    DCIS (ductal carcinoma in situ) of breast    Personal history of breast cancer    Primary hypertension    Pure hypercholesterolemia    Pneumonia    Acute respiratory failure with hypoxia (HCC)    Asthma    IgG3 deficiency ([facility] [address]          Hyperlipidemia  This is a chronic problem. The current episode started more than 1 year ago. The problem is controlled. Pertinent negatives include no chest pain, leg pain, myalgias or shortness of breath. Current antihyperlipidemic treatment includes statins. There are no compliance problems. Anxiety  Presents for follow-up visit. Patient reports no chest pain, decreased concentration, depressed mood, excessive worry, insomnia, irritability, nervous/anxious behavior, palpitations or shortness of breath.      - doing well on Zoloft at current dose. [id number]: wants to see new hematologist.    Allergies   Allergen Reactions    Elemental Sulfur Anaphylaxis and Other (See Comments)     Sores in mouth, throat closed    Doxycycline     Sulfa Antibiotics        Current Outpatient Medications on File Prior to Visit   Medication Sig Dispense Refill    albuterol sulfate HFA (PROVENTIL;VENTOLIN;PROAIR) 108 (90 Base) MCG/ACT inhaler INHALE 2 PUFFS INTO THE LUNGS EVERY 6 HOURS AS NEEDED FOR WHEEZING 6.7 g 3    sertraline (ZOLOFT) 100 MG tablet TAKE 1 AND 1/2 TABLETS BY MOUTH DAILY 45 tablet 5    predniSONE (DELTASONE) 10 MG tablet 4 daily for 2 days, then 3 daily for 2 days, then 2 daily for 2 days, then 1 daily for 2 days, then 1/2 daily for 2days.  21 tablet 0    tiotropium (SPIRIVA RESPIMAT) 1.25 MCG/ACT AERS inhaler Inhale 2 puffs into the lungs daily 2 each 0    montelukast (SINGULAIR) 10 MG tablet TAKE 1 TABLET BY MOUTH EVERY NIGHT 30 tablet 5    omeprazole (PRILOSEC) 20 MG delayed release capsule Take 20 mg by mouth daily      hydroCHLOROthiazide (HYDRODIURIL) 25 MG tablet TAKE 1 TABLET BY MOUTH DAILY 30 tablet 5    atorvastatin (LIPITOR) 20 MG tablet TAKE 1 TABLET BY MOUTH DAILY 30 tablet 5    budesonide-formoterol (SYMBICORT) 160-4.5 MCG/ACT AERO Inhale 2 puffs into the lungs in the morning and 2 puffs before bedtime. 10.2 g 3    Cholecalciferol (VITAMIN D3) 2000 UNITS CAPS Take 2,000 Units by mouth daily       No current facility-administered medications on file prior to visit. Review of Systems   Constitutional:  Negative for activity change, appetite change, fatigue and irritability. Respiratory:  Positive for wheezing. Negative for shortness of breath. Cardiovascular:  Negative for chest pain and palpitations. Musculoskeletal:  Negative for myalgias. Psychiatric/Behavioral:  Negative for agitation, decreased concentration, dysphoric mood and sleep disturbance. The patient is not nervous/anxious and does not have insomnia. other review of systems reviewed and are negative    OBJECTIVE:  /84   Pulse 70   Temp 97.4 °F (36.3 °C)   Wt 171 lb (77.6 kg)   SpO2 96%   BMI 31.28 kg/m²      Physical Exam  Vitals reviewed. Eyes:      General: No scleral icterus. Conjunctiva/sclera: Conjunctivae normal.   Neck:      Thyroid: No thyromegaly. Vascular: No carotid bruit. Cardiovascular:      Rate and Rhythm: Normal rate and regular rhythm. Heart sounds: No murmur heard. Pulmonary:      Effort: Pulmonary effort is normal.      Breath sounds: Wheezing (very faint end inspiratory) present. No rales. Abdominal:      General: Bowel sounds are normal.      Palpations: Abdomen is soft. There is no mass. Tenderness: There is no abdominal tenderness. There is no guarding or rebound. Musculoskeletal:         General: Normal range of motion. Cervical back: Neck supple. Lymphadenopathy:      Cervical: No cervical adenopathy. Skin:     General: Skin is warm and dry.    Neurological:      Mental Status: She is alert and oriented to person, place, and time.   Psychiatric:         Mood and Affect: Mood normal.       ASSESSMENT AND PLAN:    Isidra was seen today for hyperlipidemia.    Diagnoses and all orders for this visit:    Pure hypercholesterolemia  -     Lipid Panel; Future  -     Cancel: Comprehensive Metabolic Panel; Future    KING (generalized anxiety disorder)    IgG3 deficiency (HCC)  -     [facility]    - continue current dose of Lipitor and Zoloft  - reviewed allergy labs from 12/22/22 that shows IGg3 def.   - follow up with pulm and allergy  - reviewed labs ordered from allergy and heme onc.    Return in about 6 months (around 7/26/2023), or if symptoms worsen or fail to improve.    Moi Mcdaniels, DO

## 2023-01-31 LAB
ALBUMIN SERPL-MCNC: 4.1 G/DL
ALP BLD-CCNC: 67 U/L
ALT SERPL-CCNC: 16 U/L
ANION GAP SERPL CALCULATED.3IONS-SCNC: NORMAL MMOL/L
AST SERPL-CCNC: 14 U/L
BILIRUB SERPL-MCNC: 0.7 MG/DL (ref 0.1–1.4)
BUN BLDV-MCNC: 14 MG/DL
CALCIUM SERPL-MCNC: 9.6 MG/DL
CHLORIDE BLD-SCNC: 101 MMOL/L
CHOLESTEROL, TOTAL: 171 MG/DL
CHOLESTEROL/HDL RATIO: 2.6
CO2: 34 MMOL/L
CREAT SERPL-MCNC: 0.73 MG/DL
GFR SERPL CREATININE-BSD FRML MDRD: 92 ML/MIN/{1.73_M2}
GLUCOSE BLD-MCNC: 85 MG/DL
HDLC SERPL-MCNC: 66 MG/DL (ref 35–70)
LDL CHOLESTEROL CALCULATED: 92 MG/DL (ref 0–160)
NONHDLC SERPL-MCNC: 105 MG/DL
POTASSIUM SERPL-SCNC: 3.7 MMOL/L
SODIUM BLD-SCNC: 141 MMOL/L
TOTAL PROTEIN: 6.8
TRIGL SERPL-MCNC: 52 MG/DL
VLDLC SERPL CALC-MCNC: NORMAL MG/DL

## 2023-02-01 DIAGNOSIS — E78.00 PURE HYPERCHOLESTEROLEMIA: ICD-10-CM

## 2023-02-06 ENCOUNTER — TELEPHONE (OUTPATIENT)
Dept: INFUSION THERAPY | Age: 63
End: 2023-02-06

## 2023-02-06 ENCOUNTER — HOSPITAL ENCOUNTER (OUTPATIENT)
Dept: INFUSION THERAPY | Age: 63
Discharge: HOME | End: 2023-02-06
Payer: COMMERCIAL

## 2023-02-06 ENCOUNTER — OFFICE VISIT (OUTPATIENT)
Dept: ONCOLOGY | Age: 63
End: 2023-02-06
Payer: COMMERCIAL

## 2023-02-06 VITALS
TEMPERATURE: 97 F | SYSTOLIC BLOOD PRESSURE: 141 MMHG | WEIGHT: 169.2 LBS | DIASTOLIC BLOOD PRESSURE: 83 MMHG | BODY MASS INDEX: 31.14 KG/M2 | HEIGHT: 62 IN | HEART RATE: 92 BPM | OXYGEN SATURATION: 95 %

## 2023-02-06 DIAGNOSIS — R06.02 SHORTNESS OF BREATH: ICD-10-CM

## 2023-02-06 DIAGNOSIS — D80.3 IGG3 DEFICIENCY (HCC): ICD-10-CM

## 2023-02-06 DIAGNOSIS — D80.3 IGG3 DEFICIENCY (HCC): Primary | ICD-10-CM

## 2023-02-06 LAB
ALBUMIN SERPL-MCNC: 4.3 G/DL (ref 3.5–5.2)
ALP BLD-CCNC: 75 U/L (ref 35–104)
ALT SERPL-CCNC: 15 U/L (ref 0–32)
ANION GAP SERPL CALCULATED.3IONS-SCNC: 10 MMOL/L (ref 7–16)
AST SERPL-CCNC: 17 U/L (ref 0–31)
BASOPHILS ABSOLUTE: 0.1 E9/L (ref 0–0.2)
BASOPHILS RELATIVE PERCENT: 0.9 % (ref 0–2)
BILIRUB SERPL-MCNC: 0.8 MG/DL (ref 0–1.2)
BUN BLDV-MCNC: 16 MG/DL (ref 6–23)
CALCIUM SERPL-MCNC: 10 MG/DL (ref 8.6–10.2)
CHLORIDE BLD-SCNC: 97 MMOL/L (ref 98–107)
CO2: 32 MMOL/L (ref 22–29)
CREAT SERPL-MCNC: 0.8 MG/DL (ref 0.5–1)
EOSINOPHILS ABSOLUTE: 0.4 E9/L (ref 0.05–0.5)
EOSINOPHILS RELATIVE PERCENT: 3.5 % (ref 0–6)
FOLATE: 9.8 NG/ML (ref 4.8–24.2)
GFR SERPL CREATININE-BSD FRML MDRD: >60 ML/MIN/1.73
GLUCOSE BLD-MCNC: 84 MG/DL (ref 74–99)
HCT VFR BLD CALC: 44.9 % (ref 34–48)
HEMOGLOBIN: 14.4 G/DL (ref 11.5–15.5)
IGA: 245 MG/DL (ref 70–400)
IGG: 905 MG/DL (ref 700–1600)
IGM: 195 MG/DL (ref 40–230)
IMMATURE GRANULOCYTES #: 0.04 E9/L
IMMATURE GRANULOCYTES %: 0.4 % (ref 0–5)
LACTATE DEHYDROGENASE: 197 U/L (ref 135–214)
LYMPHOCYTES ABSOLUTE: 1.8 E9/L (ref 1.5–4)
LYMPHOCYTES RELATIVE PERCENT: 15.8 % (ref 20–42)
MCH RBC QN AUTO: 27.5 PG (ref 26–35)
MCHC RBC AUTO-ENTMCNC: 32.1 % (ref 32–34.5)
MCV RBC AUTO: 85.9 FL (ref 80–99.9)
MONOCYTES ABSOLUTE: 0.96 E9/L (ref 0.1–0.95)
MONOCYTES RELATIVE PERCENT: 8.4 % (ref 2–12)
NEUTROPHILS ABSOLUTE: 8.11 E9/L (ref 1.8–7.3)
NEUTROPHILS RELATIVE PERCENT: 71 % (ref 43–80)
PDW BLD-RTO: 13.5 FL (ref 11.5–15)
PLATELET # BLD: 309 E9/L (ref 130–450)
PMV BLD AUTO: 9.4 FL (ref 7–12)
POTASSIUM SERPL-SCNC: 3.7 MMOL/L (ref 3.5–5)
RBC # BLD: 5.23 E12/L (ref 3.5–5.5)
SODIUM BLD-SCNC: 139 MMOL/L (ref 132–146)
TOTAL PROTEIN: 7.7 G/DL (ref 6.4–8.3)
VITAMIN B-12: 434 PG/ML (ref 211–946)
WBC # BLD: 11.4 E9/L (ref 4.5–11.5)

## 2023-02-06 PROCEDURE — 82785 ASSAY OF IGE: CPT

## 2023-02-06 PROCEDURE — 82607 VITAMIN B-12: CPT

## 2023-02-06 PROCEDURE — 99214 OFFICE O/P EST MOD 30 MIN: CPT

## 2023-02-06 PROCEDURE — 3077F SYST BP >= 140 MM HG: CPT | Performed by: STUDENT IN AN ORGANIZED HEALTH CARE EDUCATION/TRAINING PROGRAM

## 2023-02-06 PROCEDURE — 83615 LACTATE (LD) (LDH) ENZYME: CPT

## 2023-02-06 PROCEDURE — 99204 OFFICE O/P NEW MOD 45 MIN: CPT | Performed by: STUDENT IN AN ORGANIZED HEALTH CARE EDUCATION/TRAINING PROGRAM

## 2023-02-06 PROCEDURE — 36415 COLL VENOUS BLD VENIPUNCTURE: CPT

## 2023-02-06 PROCEDURE — 3079F DIAST BP 80-89 MM HG: CPT | Performed by: STUDENT IN AN ORGANIZED HEALTH CARE EDUCATION/TRAINING PROGRAM

## 2023-02-06 PROCEDURE — 85025 COMPLETE CBC W/AUTO DIFF WBC: CPT

## 2023-02-06 PROCEDURE — 82784 ASSAY IGA/IGD/IGG/IGM EACH: CPT

## 2023-02-06 PROCEDURE — 82746 ASSAY OF FOLIC ACID SERUM: CPT

## 2023-02-06 PROCEDURE — 80053 COMPREHEN METABOLIC PANEL: CPT

## 2023-02-06 PROCEDURE — 82787 IGG 1 2 3 OR 4 EACH: CPT

## 2023-02-06 RX ORDER — AZITHROMYCIN 500 MG/1
500 TABLET, FILM COATED ORAL DAILY
Qty: 3 TABLET | Refills: 0 | Status: SHIPPED | OUTPATIENT
Start: 2023-02-06 | End: 2023-02-09

## 2023-02-06 ASSESSMENT — ENCOUNTER SYMPTOMS
SHORTNESS OF BREATH: 1
DIARRHEA: 0
SORE THROAT: 0
COUGH: 1

## 2023-02-06 NOTE — PROGRESS NOTES
Alexia Solomon  1960 60 y.o. Referring Physician:     PCP: Gabi Gayle,     Vitals:    23 0904   BP: (!) 141/83   Pulse: 92   Temp: 97 °F (36.1 °C)   SpO2: 95%        Wt Readings from Last 3 Encounters:   23 169 lb 3.2 oz (76.7 kg)   23 171 lb (77.6 kg)   23 168 lb (76.2 kg)        Body mass index is 30.95 kg/m². Chief Complaint:   Chief Complaint   Patient presents with    New Patient     IgG deficiency           LMP: early 52's    Age at first Menses: 14    : 0    Para: 0          Current Outpatient Medications:     albuterol sulfate HFA (PROVENTIL;VENTOLIN;PROAIR) 108 (90 Base) MCG/ACT inhaler, INHALE 2 PUFFS INTO THE LUNGS EVERY 6 HOURS AS NEEDED FOR WHEEZING, Disp: 6.7 g, Rfl: 3    sertraline (ZOLOFT) 100 MG tablet, TAKE 1 AND 1/2 TABLETS BY MOUTH DAILY, Disp: 45 tablet, Rfl: 5    tiotropium (SPIRIVA RESPIMAT) 1.25 MCG/ACT AERS inhaler, Inhale 2 puffs into the lungs daily, Disp: 2 each, Rfl: 0    montelukast (SINGULAIR) 10 MG tablet, TAKE 1 TABLET BY MOUTH EVERY NIGHT, Disp: 30 tablet, Rfl: 5    omeprazole (PRILOSEC) 20 MG delayed release capsule, Take 20 mg by mouth daily, Disp: , Rfl:     hydroCHLOROthiazide (HYDRODIURIL) 25 MG tablet, TAKE 1 TABLET BY MOUTH DAILY, Disp: 30 tablet, Rfl: 5    atorvastatin (LIPITOR) 20 MG tablet, TAKE 1 TABLET BY MOUTH DAILY, Disp: 30 tablet, Rfl: 5    budesonide-formoterol (SYMBICORT) 160-4.5 MCG/ACT AERO, Inhale 2 puffs into the lungs in the morning and 2 puffs before bedtime. , Disp: 10.2 g, Rfl: 3    Cholecalciferol (VITAMIN D3) 2000 UNITS CAPS, Take 2,000 Units by mouth daily, Disp: , Rfl:     predniSONE (DELTASONE) 10 MG tablet, 4 daily for 2 days, then 3 daily for 2 days, then 2 daily for 2 days, then 1 daily for 2 days, then 1/2 daily for 2days.  (Patient not taking: Reported on 2023), Disp: 21 tablet, Rfl: 0       Past Medical History:   Diagnosis Date    Asthma     Breast cancer ([facility]     Cancer ([facility] 01/14/2013    Left breast DCIS on bx    Endometriosis     GERD (gastroesophageal reflux disease)     History of therapeutic radiation     Hx of Doppler ultrasound     10/01/1998 Normal Cardio Echo    Hyperlipidemia     Hypertension     Low vitamin D level     Osteopenia     Reactive airway disease 03/2020    Having symptoms intermittenly since March 2020    Vision disturbance     left eye - cataract       Past Surgical History:   Procedure Laterality Date    BREAST BIOPSY Left 01/14/2013    DCIS    BREAST LUMPECTOMY      BREAST SURGERY Left 02/27/2013    DCIS Lumpectomy.  Dr. Lyssa Goldsmith  2010    Dr. Nyle Spatz - normal    COLONOSCOPY  03/01/2017    [facility]    EYE SURGERY      Cataract Surgery (Dr. Serenity Wilhelm)- 07/2014     LAPAROSCOPY  1991    TONSILLECTOMY      UPPER GASTROINTESTINAL ENDOSCOPY  2005     [facility] GERD    UPPER GASTROINTESTINAL ENDOSCOPY  04/15/2005       Family History   Problem Relation Age of Onset    High Blood Pressure Mother     Heart Disease Father     High Cholesterol Father     High Blood Pressure Sister     High Cholesterol Sister     Arthritis Maternal Grandmother     Arthritis Paternal Grandmother     Diabetes Paternal Grandmother     Heart Disease Paternal Grandmother     High Blood Pressure Paternal Grandmother     High Cholesterol Paternal Grandmother     Cancer Paternal Grandmother         lung    Kidney Disease Paternal Grandfather     High Blood Pressure Maternal Aunt     Arthritis Paternal Aunt     Diabetes Paternal Aunt     Heart Disease Paternal Aunt     High Blood Pressure Paternal Aunt     High Cholesterol Paternal Aunt     Stroke Paternal Aunt     Heart Disease Paternal Uncle     High Blood Pressure Paternal Uncle        Social History     Socioeconomic History    Marital status:      Spouse name: Not on file    Number of children: Not on file    Years of education: Not on file    Highest education level: Not on file   Occupational History    Occupation: billing Tobacco Use    Smoking status: Never    Smokeless tobacco: Never   Vaping Use    Vaping Use: Never used   Substance and Sexual Activity    Alcohol use: Yes     Types: 1 - 2 Glasses of wine per week     Comment: occas    Drug use: No    Sexual activity: Not Currently   Other Topics Concern    Not on file   Social History Narrative    Not on file     Social Determinants of Health     Financial Resource Strain: Not on file   Food Insecurity: Not on file   Transportation Needs: Not on file   Physical Activity: Not on file   Stress: Not on file   Social Connections: Not on file   Intimate Partner Violence: Not on file   Housing Stability: Not on file           Occupation: retired  Retired:  YES: Patient is retired from [employer] REVIEW OF SYSTEMS:     Pacemaker/Defibulator/ICD:  No    Mediport: No           FALLS RISK SCREENING ASSESSMENT    Instructions:  Assess the patient and [place] the appropriate indicators that are present for fall risk identification. Total the numbers circled and assign a fall risk score from Table 2.  Reassess patient at a minimum every 12 weeks or with status change. Assessment   Date  2/6/2023     1. Mental Ability: confusion/cognitively impaired No - 0       2. Elimination Issues: incontinence, frequency No - 0       3. Ambulatory: use of assistive devices (walker, cane, off-loading devices), attached to equipment (IV pole, oxygen) No - 0     4. Sensory Limitations: dizziness, vertigo, impaired vision No - 0       5. Age Less than 65 years - 0       6. Medication: diuretics, strong analgesics, hypnotics, sedatives, antihypertensive agents   Yes - 3   7. Falls:  recent history of falls within the last 3 months (not to include slipping or tripping)   No - 0   TOTAL 3    If score of 4 or greater was education given? No       TABLE 2   Risk Score Risk Level Plan of Care   0-3 Little or  No Risk 1. Provide assistance as indicated for ambulation activities  2. Reorient confused/cognitively impaired patient  3. Call-light/bell within patient's reach  4. Chair/bed in low position, stretcher/bed with siderails up except when performing patient care activities  5. Educate patient/family/caregiver on falls prevention  6.  Reassess in 12 weeks or with any noted change in patient condition which places them at a risk for a fall   4-6 Moderate Risk 1. Provide assistance as indicated for ambulation activities  2. Reorient confused/cognitively impaired patient  3. Call-light/bell within patient's reach  4. Chair/bed in low position, stretcher/bed with siderails up except when performing patient care activities  5. Educate patient/family/caregiver on falls prevention  6. Falls risk precaution (Yellow sticker Level II) placed on patient chart   7 or   Higher High Risk 1. Place patient in easily observable treatment room  2. Patient attended at all times by family member or staff  3. Provide assistance as indicated for ambulation activities  4. Reorient confused/cognitively impaired patient  5. Call-light/bell within patient's reach  6. Chair/bed in low position, stretcher/bed with siderails up except when performing patient care activities  7. Educate patient/family/caregiver on falls prevention  8.   Falls risk precaution (Yellow sticker Level III) placed on patient chart                     Lulú Jensen RN

## 2023-02-06 NOTE — TELEPHONE ENCOUNTER
[place] paper faxed to Dr Sissy Mendoza office with fax confirmation received per Dr Fartun Wilhelm, [place]

## 2023-02-06 NOTE — PROGRESS NOTES
[address] [facility] MEDICAL ONCOLOGY  [address] [place] [address]  Dept: [phone]  Loc: [phone]  Clinic Consultation Note    Referring Provider:  Reva Abreu DO    Reason for Visit:   IgG2 and IgG3 deficiencies    PCP:  Reva Abreu DO    Demographics: 60 y.o. female    Chief Complaint:   Chief Complaint   Patient presents with    New Patient     IgG deficiency       History of Present Illness (2/6/23): The patient is a 60 y.o. 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 Since 2021 when she had COVID-19, she has had recurrent pulmonary symptoms and pneumonia, and reports that she has been on frequent courses of prednisone. She states of late, she has been taking 10 days worth of prednisone almost every 6 weeks. In September 2022, she was hospitalized for acute respiratory failure, she was found to have atypical pneumonia. During his hospital course and was found that she had an IgG subtype 3 level of 4. This prompted further evaluation, which patient had been seen by hematology/oncology at the [facility]. Repeat immunoglobulin levels were done again in October and December 2022, showed persistent decreases in IgG 3 subclass, as well as a decrease in IgG 2 subclass. Today, patient was accompanied by her . She does have shortness of breath again. She reports last course of prednisone was completed around mid January few weeks ago. She denies of fevers or chills, unintended weight loss, sore throat, dysuria, diarrhea, skin infections, or lymphadenopathy. Does have very mild sinus symptoms, but states that her respiratory symptoms are more prominent. She has also had some fluctuations of her weight given being on frequent intermittent prednisone use.     Apart from prednisone and recent hospitalization/antibiotics, she has not started treatment for her decreased IgG subset levels. She had already been seen by allergy/immunology under the care of Dr. Dante Wheeler, currently undergoing active work-up. Patient had pneumonia vaccine in December. Review of Systems; Review of Systems   Constitutional:  Positive for appetite change. Negative for fever. HENT: Negative. Negative for sore throat. Eyes:  Negative for visual disturbance. Respiratory:  Positive for cough and shortness of breath. Cardiovascular:  Negative for chest pain and palpitations. Gastrointestinal:  Negative for diarrhea. Genitourinary:  Negative for dysuria and urgency. Musculoskeletal: Negative. Skin:  Negative for rash. Neurological: Negative. Hematological:  Negative for adenopathy. Does not bruise/bleed easily. Psychiatric/Behavioral: Negative. Past Medical History:      Diagnosis Date    Asthma     Breast cancer ([facility] [address]     Cancer ([facility] [address] 01/14/2013    Left breast DCIS on bx    Endometriosis     GERD (gastroesophageal reflux disease)     History of therapeutic radiation     Hx of Doppler ultrasound     10/01/1998 Normal Cardio Echo    Hyperlipidemia     Hypertension     Low vitamin D level     Osteopenia     Reactive airway disease 03/2020    Having symptoms intermittenly since March 2020    Vision disturbance     left eye - cataract     Patient Active Problem List   Diagnosis    DCIS (ductal carcinoma in situ) of breast    Personal history of breast cancer    Primary hypertension    Pure hypercholesterolemia    Pneumonia    Acute respiratory failure with hypoxia (HCC)    Asthma    IgG3 deficiency ([address]        Past Surgical History:      Procedure Laterality Date    BREAST BIOPSY Left 01/14/2013    DCIS    BREAST LUMPECTOMY      BREAST SURGERY Left 02/27/2013    DCIS Lumpectomy.  Dr. Akiko Mendoza  2010    Dr. Simi Bryant - normal    COLONOSCOPY  03/01/2017    [facility]    EYE SURGERY      Cataract Surgery (Dr. Jazmyn Small)- 07/2014     LAPAROSCOPY  1991    TONSILLECTOMY UPPER GASTROINTESTINAL ENDOSCOPY  2005    Dr. Alonso Andre GERD    UPPER GASTROINTESTINAL ENDOSCOPY  04/15/2005       Family History:  Family History   Problem Relation Age of Onset    High Blood Pressure Mother     Heart Disease Father     High Cholesterol Father     High Blood Pressure Sister     High Cholesterol Sister     High Cholesterol Sister     High Blood Pressure Sister     High Blood Pressure Maternal Aunt     Arthritis Paternal Aunt     Diabetes Paternal Aunt     Heart Disease Paternal Aunt     High Blood Pressure Paternal Aunt     High Cholesterol Paternal Aunt     Stroke Paternal Aunt     Heart Disease Paternal Uncle     High Blood Pressure Paternal Uncle     Arthritis Maternal Grandmother     Arthritis Paternal Grandmother     Diabetes Paternal Grandmother     Heart Disease Paternal Grandmother     High Blood Pressure Paternal Grandmother     High Cholesterol Paternal Grandmother     Cancer Paternal Grandmother         lung    Kidney Disease Paternal Grandfather        Medications:  Reviewed and reconciled.   Current Outpatient Medications   Medication Sig Dispense Refill    azithromycin (ZITHROMAX) 500 MG tablet Take 1 tablet by mouth daily for 3 days 3 tablet 0    albuterol sulfate HFA (PROVENTIL;VENTOLIN;PROAIR) 108 (90 Base) MCG/ACT inhaler INHALE 2 PUFFS INTO THE LUNGS EVERY 6 HOURS AS NEEDED FOR WHEEZING 6.7 g 3    sertraline (ZOLOFT) 100 MG tablet TAKE 1 AND 1/2 TABLETS BY MOUTH DAILY 45 tablet 5    tiotropium (SPIRIVA RESPIMAT) 1.25 MCG/ACT AERS inhaler Inhale 2 puffs into the lungs daily 2 each 0    montelukast (SINGULAIR) 10 MG tablet TAKE 1 TABLET BY MOUTH EVERY NIGHT 30 tablet 5    omeprazole (PRILOSEC) 20 MG delayed release capsule Take 20 mg by mouth daily      hydroCHLOROthiazide (HYDRODIURIL) 25 MG tablet TAKE 1 TABLET BY MOUTH DAILY 30 tablet 5    atorvastatin (LIPITOR) 20 MG tablet TAKE 1 TABLET BY MOUTH DAILY 30 tablet 5    budesonide-formoterol (SYMBICORT) 160-4.5 MCG/ACT AERO Inhale 2 puffs into the lungs in the morning and 2 puffs before bedtime. 10.2 g 3    Cholecalciferol (VITAMIN D3) 2000 UNITS CAPS Take 2,000 Units by mouth daily      predniSONE (DELTASONE) 10 MG tablet 4 daily for 2 days, then 3 daily for 2 days, then 2 daily for 2 days, then 1 daily for 2 days, then 1/2 daily for 2days. (Patient not taking: Reported on 2/6/2023) 21 tablet 0     No current facility-administered medications for this visit. Social History:  Social History     Socioeconomic History    Marital status:      Spouse name: Not on file    Number of children: Not on file    Years of education: Not on file    Highest education level: Not on file   Occupational History    Occupation: billing   Tobacco Use    Smoking status: Never    Smokeless tobacco: Never   Vaping Use    Vaping Use: Never used   Substance and Sexual Activity    Alcohol use: Not Currently     Comment: moderate-varies    Drug use: No    Sexual activity: Not Currently   Other Topics Concern    Not on file   Social History Narrative    Not on file     Social Determinants of Health     Financial Resource Strain: Not on file   Food Insecurity: Not on file   Transportation Needs: Not on file   Physical Activity: Not on file   Stress: Not on file   Social Connections: Not on file   Intimate Partner Violence: Not on file   Housing Stability: Not on file       Allergies: Allergies   Allergen Reactions    Elemental Sulfur Anaphylaxis and Other (See Comments)     Sores in mouth, throat closed    Doxycycline     Sulfa Antibiotics        Physical Exam:  BP (!) 141/83   Pulse 92   Temp 97 °F (36.1 °C)   Ht 5' 2\" (1.575 m)   Wt 169 lb 3.2 oz (76.7 kg)   SpO2 95%   BMI 30.95 kg/m²   Physical Exam  Constitutional:       General: She is not in acute distress. Appearance: She is not ill-appearing or toxic-appearing. HENT:      Head: Normocephalic.       Nose: Nose normal.      Mouth/Throat:      Mouth: Mucous membranes are moist.      Pharynx: No oropharyngeal exudate or posterior oropharyngeal erythema. Eyes:      General: No scleral icterus. Extraocular Movements: Extraocular movements intact. Cardiovascular:      Rate and Rhythm: Normal rate and regular rhythm. Heart sounds: No murmur heard. Pulmonary:      Effort: Pulmonary effort is normal. No respiratory distress. Breath sounds: No stridor. Wheezing present. Abdominal:      General: There is no distension. Palpations: There is no mass. Tenderness: There is no abdominal tenderness. Musculoskeletal:      Cervical back: Normal range of motion. No rigidity. Right lower leg: No edema. Left lower leg: No edema. Lymphadenopathy:      Cervical: No cervical adenopathy. Skin:     General: Skin is warm. Findings: No lesion or rash. Neurological:      General: No focal deficit present. Mental Status: She is oriented to person, place, and time. Psychiatric:         Mood and Affect: Mood normal.         Behavior: Behavior normal.         Thought Content: Thought content normal.       ECOG PS 0    9/13/2022:   IgG1 = 572  IgG2 = 184  IgG 3 = 4 (low)  IgG4 = 29    10/31/2022  Total IgG 873  IgG 2 = 146 (low)  IgG3 = 6 (low)    12/20/2022  IgA 184, IgG 851, IgM 173, IgE 28  IgG 1 = 471  IgG2 = 119 (low)  IgG 3 = 3 (low)  IgG4 = 23.8      ASSESSMENT:    IgG3 deficiency  IgG2 deficiency  Recurrent sinopulmonary infections  Hypertension    The patient is a 60 y.o. female who established me on 2/6/2023, after referral from PCP for IgG3 deficiency. She also was found to have low levels of IgG2 subset. The patient reports since 2021 since her COVID-19 infection, she has had recurrent respiratory symptoms, and reported recurrent pneumonias. She has required frequent courses of prednisone, in which patient reports almost every 5-6 weeks. She had a hospital admission in September 2022.   Further work-up noted a decrease of IgG3 level (see above), when evaluated by pulmonology. This was also further evaluated with hematology/oncology at the [facility]. There were recommendations to initiate IVIG. Patient also has seen immunology with Dr. Dayanna Lam. Work-up included repeat immunoglobulin levels, which did not note for persistent IgG3 levels. Patient states that some the testing may have been done while she was on prednisone. Her work-up included hypersensitivity labs, ANCA screen, complement levels, Streptococcus pneumoniae serologies, CD4/CD8 levels, all which were largely unremarkable (See Media, dated 1/25/2023). And also h ad diphtheria and tetanus antitoxin titers checked, which were within normal range. Per chart review she had Prevnar 20 on 12/28/2022. She is also started shingles vaccination series. Patient was staffed with me on 2/6/2023, she had not started IVIG or other therapies specifically focused on treating her IgG subtype 3 process, and she has also completed a course of steroids at least a few weeks prior to seeing me at that time. PLAN:  Today we will check CBC, CMP, LDH  And since patient has been off of prednisone for few weeks, we will recheck immunoglobulins including IgG subtypes again  We will check vitamin B12/folate  Request records from allergy/immunology, and will touch base with Dr. Dayanna Lam  Today, will write for [facility]  Will hold off on starting aggressive treatments for immunodeficiency until I communicate with allergy/immunology  Patient states she also had labs done through [address] [place] [address] Lab in [place], and will have them faxed to us    DISPO:   RTC 3 weeks      Thank you for allowing us to participate in the care of Fay Regalado       Approximately spent 58 minutes on chart review as well as time spent on patient encounter, discussing the laboratory, imaging, and clinical findings; and documentation. I have discussed clinical implications and recommendations on the patient's primary issues.  More than 50% of time was spent counseling patient. The patient verbalized understanding.       [address]   02/06/23 9:43 AM    Brittnee Randle [facility]) Office  P: [phone]  F: [phone]    Gladis Lo) Office  P: [phone]  F: [phone]

## 2023-02-09 LAB
IGE: 33 KU/L
IGG 1: 524 MG/DL (ref 240–1118)
IGG 2: 237 MG/DL (ref 124–549)
IGG 3: 4 MG/DL (ref 21–134)
IGG 4: 26 MG/DL (ref 1–123)

## 2023-02-13 ENCOUNTER — OFFICE VISIT (OUTPATIENT)
Dept: PRIMARY CARE CLINIC | Age: 63
End: 2023-02-13
Payer: COMMERCIAL

## 2023-02-13 VITALS
HEIGHT: 62 IN | DIASTOLIC BLOOD PRESSURE: 84 MMHG | WEIGHT: 166.5 LBS | HEART RATE: 84 BPM | SYSTOLIC BLOOD PRESSURE: 136 MMHG | OXYGEN SATURATION: 95 % | TEMPERATURE: 98 F | BODY MASS INDEX: 30.64 KG/M2

## 2023-02-13 DIAGNOSIS — J45.42 MODERATE PERSISTENT ASTHMA WITH STATUS ASTHMATICUS: Primary | ICD-10-CM

## 2023-02-13 DIAGNOSIS — D80.3 IGG3 DEFICIENCY (HCC): ICD-10-CM

## 2023-02-13 PROCEDURE — 99213 OFFICE O/P EST LOW 20 MIN: CPT | Performed by: FAMILY MEDICINE

## 2023-02-13 PROCEDURE — 3079F DIAST BP 80-89 MM HG: CPT | Performed by: FAMILY MEDICINE

## 2023-02-13 PROCEDURE — 3075F SYST BP GE 130 - 139MM HG: CPT | Performed by: FAMILY MEDICINE

## 2023-02-13 RX ORDER — PREDNISONE 10 MG/1
TABLET ORAL
Qty: 21 TABLET | Refills: 0 | Status: SHIPPED | OUTPATIENT
Start: 2023-02-13

## 2023-02-13 ASSESSMENT — ENCOUNTER SYMPTOMS
WHEEZING: 1
SHORTNESS OF BREATH: 1
COUGH: 1
RHINORRHEA: 0

## 2023-02-13 NOTE — PROGRESS NOTES
Leroy Landry, a female of 60 y.o. came to the office 2/13/2023. Patient Active Problem List   Diagnosis    DCIS (ductal carcinoma in situ) of breast    Personal history of breast cancer    Primary hypertension    Pure hypercholesterolemia    Pneumonia    Acute respiratory failure with hypoxia ([facility] [address]    Asthma    IgG3 deficiency ([facility] [address]          Asthma  She complains of cough (yellow sticky phlegm production.), shortness of breath and wheezing. This is a chronic problem. The current episode started more than 1 year ago. The problem has been waxing and waning. Associated symptoms include malaise/fatigue and postnasal drip. Pertinent negatives include no fever, nasal congestion or rhinorrhea. Her symptoms are alleviated by oral steroids. She reports significant improvement on treatment. Her past medical history is significant for asthma.    - recently put on 3 day course of Zithromax per oncologist without effect.         Allergies   Allergen Reactions    Elemental Sulfur Anaphylaxis and Other (See Comments)     Sores in mouth, throat closed    Doxycycline     Sulfa Antibiotics        Current Outpatient Medications on File Prior to Visit   Medication Sig Dispense Refill    albuterol sulfate HFA (PROVENTIL;VENTOLIN;PROAIR) 108 (90 Base) MCG/ACT inhaler INHALE 2 PUFFS INTO THE LUNGS EVERY 6 HOURS AS NEEDED FOR WHEEZING 6.7 g 3    sertraline (ZOLOFT) 100 MG tablet TAKE 1 AND 1/2 TABLETS BY MOUTH DAILY 45 tablet 5    tiotropium (SPIRIVA RESPIMAT) 1.25 MCG/ACT AERS inhaler Inhale 2 puffs into the lungs daily 2 each 0    montelukast (SINGULAIR) 10 MG tablet TAKE 1 TABLET BY MOUTH EVERY NIGHT 30 tablet 5    omeprazole (PRILOSEC) 20 MG delayed release capsule Take 20 mg by mouth daily      hydroCHLOROthiazide (HYDRODIURIL) 25 MG tablet TAKE 1 TABLET BY MOUTH DAILY 30 tablet 5    atorvastatin (LIPITOR) 20 MG tablet TAKE 1 TABLET BY MOUTH DAILY 30 tablet 5    budesonide-formoterol (SYMBICORT) 160-4.5 MCG/ACT AERO Inhale 2 puffs into the lungs in the morning and 2 puffs before bedtime. 10.2 g 3    Cholecalciferol (VITAMIN D3) 2000 UNITS CAPS Take 2,000 Units by mouth daily       No current facility-administered medications on file prior to visit. Review of Systems   Constitutional:  Positive for malaise/fatigue. Negative for chills and fever. HENT:  Positive for postnasal drip. Negative for rhinorrhea. Respiratory:  Positive for cough (yellow sticky phlegm production.), shortness of breath and wheezing. other review of systems reviewed and are negative    OBJECTIVE:  /84   Pulse 84   Temp 98 °F (36.7 °C)   Ht 5' 2\" (1.575 m)   Wt 166 lb 8 oz (75.5 kg)   SpO2 95%   BMI 30.45 kg/m²      Physical Exam  Constitutional:       General: She is not in acute distress. Appearance: Normal appearance. She is not ill-appearing, toxic-appearing or diaphoretic. HENT:      Head: Normocephalic. Eyes:      General: No scleral icterus. Pulmonary:      Effort: Pulmonary effort is normal. No tachypnea, accessory muscle usage, prolonged expiration or respiratory distress. Breath sounds: Decreased breath sounds and wheezing (expiration in all fields.) present. Neurological:      Mental Status: She is alert and oriented to person, place, and time. Psychiatric:         Mood and Affect: Mood normal.       ASSESSMENT AND PLAN:    Korin Carpenter was seen today for asthma. Diagnoses and all orders for this visit:    Moderate persistent asthma with status asthmaticus  -     predniSONE (DELTASONE) 10 MG tablet; 4 daily for 2 days, then 3 daily for 2 days, then 2 daily for 2 days, then 1 daily for 2 days, then 1/2 daily for 2days. IgG3 deficiency ([facility] [address]  - recommend IVIG therapy if heme/onc agrees. - continue all other current meds for her asthma    Return if symptoms worsen or fail to improve.     Anila Mcdaniels, DO

## 2023-02-14 DIAGNOSIS — J45.901 ACUTE EXACERBATION OF EXTRINSIC ASTHMA: ICD-10-CM

## 2023-02-14 RX ORDER — BUDESONIDE AND FORMOTEROL FUMARATE DIHYDRATE 160; 4.5 UG/1; UG/1
AEROSOL RESPIRATORY (INHALATION)
Qty: 10.2 G | Refills: 3 | Status: SHIPPED | OUTPATIENT
Start: 2023-02-14

## 2023-02-21 ENCOUNTER — TELEPHONE (OUTPATIENT)
Dept: INFUSION THERAPY | Age: 63
End: 2023-02-21

## 2023-02-21 DIAGNOSIS — D80.3 IGG3 DEFICIENCY (HCC): Primary | ICD-10-CM

## 2023-02-21 RX ORDER — ALBUTEROL SULFATE 90 UG/1
4 AEROSOL, METERED RESPIRATORY (INHALATION) PRN
OUTPATIENT
Start: 2023-02-27

## 2023-02-21 RX ORDER — FAMOTIDINE 10 MG/ML
20 INJECTION, SOLUTION INTRAVENOUS
OUTPATIENT
Start: 2023-02-27

## 2023-02-21 RX ORDER — SODIUM CHLORIDE 9 MG/ML
5-250 INJECTION, SOLUTION INTRAVENOUS PRN
OUTPATIENT
Start: 2023-02-27

## 2023-02-21 RX ORDER — HEPARIN SODIUM (PORCINE) LOCK FLUSH IV SOLN 100 UNIT/ML 100 UNIT/ML
500 SOLUTION INTRAVENOUS PRN
OUTPATIENT
Start: 2023-02-27

## 2023-02-21 RX ORDER — DIPHENHYDRAMINE HCL 25 MG
25 TABLET ORAL ONCE
OUTPATIENT
Start: 2023-02-27 | End: 2023-02-27

## 2023-02-21 RX ORDER — ACETAMINOPHEN 325 MG/1
650 TABLET ORAL ONCE
OUTPATIENT
Start: 2023-02-27 | End: 2023-02-27

## 2023-02-21 RX ORDER — ONDANSETRON 2 MG/ML
8 INJECTION INTRAMUSCULAR; INTRAVENOUS
OUTPATIENT
Start: 2023-02-27

## 2023-02-21 RX ORDER — MEPERIDINE HYDROCHLORIDE 50 MG/ML
12.5 INJECTION INTRAMUSCULAR; INTRAVENOUS; SUBCUTANEOUS PRN
OUTPATIENT
Start: 2023-02-27

## 2023-02-21 RX ORDER — EPINEPHRINE 1 MG/ML
0.3 INJECTION, SOLUTION, CONCENTRATE INTRAVENOUS PRN
OUTPATIENT
Start: 2023-02-27

## 2023-02-21 RX ORDER — SODIUM CHLORIDE 9 MG/ML
INJECTION, SOLUTION INTRAVENOUS CONTINUOUS
OUTPATIENT
Start: 2023-02-27

## 2023-02-21 RX ORDER — DIPHENHYDRAMINE HYDROCHLORIDE 50 MG/ML
50 INJECTION INTRAMUSCULAR; INTRAVENOUS
OUTPATIENT
Start: 2023-02-27

## 2023-02-21 RX ORDER — SODIUM CHLORIDE 0.9 % (FLUSH) 0.9 %
5-40 SYRINGE (ML) INJECTION PRN
OUTPATIENT
Start: 2023-02-27

## 2023-02-21 RX ORDER — ACETAMINOPHEN 325 MG/1
650 TABLET ORAL
OUTPATIENT
Start: 2023-02-27

## 2023-02-21 NOTE — PROGRESS NOTES
Earlier I had spoken with Dr. Ankita Alexandre. Patient on her repeat labs including vaccination titers, seems to suggest immune system is responsive. After our discussion, trialing with IVIG is acceptable to assess symptom response. I reached out to patient, updating her, and she is agreeable to proceed with IVIG. Otherwise, would encourage to follow up with Dr. Ankita Alexandre. Will be seeing patient on Monday.

## 2023-02-21 NOTE — TELEPHONE ENCOUNTER
Called patient's insurance [employer], [phone], in order to submit a prior authorization for Gamanex-C. Per Kandis Disla \"This patient has been active since 1/1/23. She has a $2000 deductible and she has met $158.88 of it, She also has an out of pocket of $6000 and has met $434.58. After patient meets both of those, this PPO will cover 80% of the cost. Her Gamunex-C does require a prior authorization and you must call [phone] or fax request to [phone], case #[id number].  Voiced understanding and submitted the authorization request.

## 2023-02-27 ENCOUNTER — HOSPITAL ENCOUNTER (OUTPATIENT)
Dept: INFUSION THERAPY | Age: 63
End: 2023-02-27

## 2023-03-01 ENCOUNTER — TELEPHONE (OUTPATIENT)
Dept: INFUSION THERAPY | Age: 63
End: 2023-03-01

## 2023-03-01 NOTE — TELEPHONE ENCOUNTER
Patient returned call and updated of her insurance's denial of Gamunex-C. Notified her that Dr. Tabitha Alvares will further review her case and will determine the next steps in her plan of care. She can still see Dr. Tabitha Alvares and have labs on 3-6-23. Patient voiced understanding. Also, notified Kevin Romeo RN , [place] in pharmacy, [place] in financial navigation, and [place] in scheduling that patient is not approved by her insurance for [employer] treatment on 3-6-23.

## 2023-03-01 NOTE — TELEPHONE ENCOUNTER
Notified Dr. Davis Holcomb of Quemulus-C denial. He voiced understanding and will need to further review case in order to determine further plan of care. Voiced understanding and will await further communications.

## 2023-03-01 NOTE — TELEPHONE ENCOUNTER
Called patient to update her regarding her insurance and her Gamunex-C, no answer. Awaiting a return call to [phone].

## 2023-03-02 DIAGNOSIS — D80.1 HYPOGAMMAGLOBULINEMIA (HCC): Primary | ICD-10-CM

## 2023-03-06 ENCOUNTER — OFFICE VISIT (OUTPATIENT)
Dept: ONCOLOGY | Age: 63
End: 2023-03-06
Payer: COMMERCIAL

## 2023-03-06 ENCOUNTER — TELEPHONE (OUTPATIENT)
Dept: INFUSION THERAPY | Age: 63
End: 2023-03-06

## 2023-03-06 ENCOUNTER — HOSPITAL ENCOUNTER (OUTPATIENT)
Dept: INFUSION THERAPY | Age: 63
Discharge: HOME | End: 2023-03-06

## 2023-03-06 VITALS
BODY MASS INDEX: 31.5 KG/M2 | TEMPERATURE: 97.1 F | SYSTOLIC BLOOD PRESSURE: 142 MMHG | HEART RATE: 75 BPM | WEIGHT: 171.2 LBS | HEIGHT: 62 IN | DIASTOLIC BLOOD PRESSURE: 85 MMHG | OXYGEN SATURATION: 100 %

## 2023-03-06 DIAGNOSIS — D80.3 IGG3 DEFICIENCY (HCC): Primary | ICD-10-CM

## 2023-03-06 PROCEDURE — 3079F DIAST BP 80-89 MM HG: CPT | Performed by: STUDENT IN AN ORGANIZED HEALTH CARE EDUCATION/TRAINING PROGRAM

## 2023-03-06 PROCEDURE — 3077F SYST BP >= 140 MM HG: CPT | Performed by: STUDENT IN AN ORGANIZED HEALTH CARE EDUCATION/TRAINING PROGRAM

## 2023-03-06 PROCEDURE — 99212 OFFICE O/P EST SF 10 MIN: CPT

## 2023-03-06 PROCEDURE — 99214 OFFICE O/P EST MOD 30 MIN: CPT | Performed by: STUDENT IN AN ORGANIZED HEALTH CARE EDUCATION/TRAINING PROGRAM

## 2023-03-06 ASSESSMENT — ENCOUNTER SYMPTOMS
SORE THROAT: 0
SHORTNESS OF BREATH: 0
COUGH: 0
DIARRHEA: 0

## 2023-03-06 NOTE — PROGRESS NOTES
[address] [facility] MEDICAL ONCOLOGY  [address] [place], [place] [address] [address]  Dept: [phone]  Loc: [phone]  Clinic Progress Note    Referring Provider:  Kit Wilkinson DO    Reason for Visit:   IgG2 and IgG3 deficiencies    PCP:  Kit Wilkinson DO    Demographics: 60 y.o. female    Chief Complaint:   Chief Complaint   Patient presents with    Follow-up       Subjective:  Patient returns to clinic, to review results. She is accompanied by her  today. She denies of major events. A few weeks back, her PCP prescribed a course of steroids/prednisone, in which she reports improved symptoms. She proves well and feels well today. Denies of significant shortness of breath or worsening cough at this time. HPI from Initial Outpatient Consultation (2/6/23): The patient is a 60 y.o. 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 Since 2021 when she had COVID-19, she has had recurrent pulmonary symptoms and pneumonia, and reports that she has been on frequent courses of prednisone. She states of late, she has been taking 10 days worth of prednisone almost every 6 weeks. In September 2022, she was hospitalized for acute respiratory failure, she was found to have atypical pneumonia. During his hospital course and was found that she had an IgG subtype 3 level of 4. This prompted further evaluation, which patient had been seen by hematology/oncology at the [facility]. Repeat immunoglobulin levels were done again in October and December 2022, showed persistent decreases in IgG 3 subclass, as well as a decrease in IgG 2 subclass. Today, patient was accompanied by her . She does have shortness of breath again. She reports last course of prednisone was completed around mid January few weeks ago.   She denies of fevers or chills, unintended weight loss, sore throat, dysuria, diarrhea, skin infections, or lymphadenopathy. Does have very mild sinus symptoms, but states that her respiratory symptoms are more prominent. She has also had some fluctuations of her weight given being on frequent intermittent prednisone use. Apart from prednisone and recent hospitalization/antibiotics, she has not started treatment for her decreased IgG subset levels. She had already been seen by allergy/immunology under the care of Dr. Carolina Quiñones, currently undergoing active work-up. Patient had pneumonia vaccine in December. Review of Systems; Review of Systems   Constitutional:  Negative for fever. HENT: Negative. Negative for sore throat. Eyes:  Negative for visual disturbance. Respiratory:  Negative for cough (Improved after taking prednisone) and shortness of breath (Improved after taking prednisone). Cardiovascular:  Negative for chest pain and palpitations. Gastrointestinal:  Negative for diarrhea. Genitourinary:  Negative for dysuria and urgency. Musculoskeletal: Negative. Skin:  Negative for rash. Neurological: Negative. Hematological:  Negative for adenopathy. Does not bruise/bleed easily. Psychiatric/Behavioral: Negative.          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    Endometriosis     GERD (gastroesophageal reflux disease)     History of therapeutic radiation     Hx of Doppler ultrasound     10/01/1998 Normal Cardio Echo    Hyperlipidemia     Hypertension     Low vitamin D level     Osteopenia     Reactive airway disease 03/2020    Having symptoms intermittenly since March 2020    Vision disturbance     left eye - cataract     Patient Active Problem List   Diagnosis    DCIS (ductal carcinoma in situ) of breast    Personal history of breast cancer    Primary hypertension    Pure hypercholesterolemia    Pneumonia    Acute respiratory failure with hypoxia ([address]    Asthma IgG3 deficiency ([facility] [address]    Hypogammaglobulinemia [facility])        Past Surgical History:      Procedure Laterality Date    BREAST BIOPSY Left 01/14/2013    DCIS    BREAST LUMPECTOMY      BREAST SURGERY Left 02/27/2013    DCIS Lumpectomy. Dr. Jessica Wall  2010    Dr. Alba Raygoza - normal    COLONOSCOPY  03/01/2017    [facility]    EYE SURGERY      Cataract Surgery (Dr. Tanya Riley)- 07/2014     LAPAROSCOPY  1991    TONSILLECTOMY      UPPER GASTROINTESTINAL ENDOSCOPY  2005     [facility] GERD    UPPER GASTROINTESTINAL ENDOSCOPY  04/15/2005       Family History:  Family History   Problem Relation Age of Onset    High Blood Pressure Mother     Heart Disease Father     High Cholesterol Father     High Blood Pressure Sister     High Cholesterol Sister     High Cholesterol Sister     High Blood Pressure Sister     High Blood Pressure Maternal Aunt     Arthritis Paternal Aunt     Diabetes Paternal Aunt     Heart Disease Paternal Aunt     High Blood Pressure Paternal Aunt     High Cholesterol Paternal Aunt     Stroke Paternal Aunt     Heart Disease Paternal Uncle     High Blood Pressure Paternal Uncle     Arthritis Maternal Grandmother     Arthritis Paternal Grandmother     Diabetes Paternal Grandmother     Heart Disease Paternal Grandmother     High Blood Pressure Paternal Grandmother     High Cholesterol Paternal Grandmother     Cancer Paternal Grandmother         lung    Kidney Disease Paternal Grandfather        Medications:  Reviewed and reconciled. Current Outpatient Medications   Medication Sig Dispense Refill    SYMBICORT 160-4.5 MCG/ACT AERO INHALE 2 PUFFS INTO THE LUNGS EVERY NIGHT IN THE MORNING AND AT BEDTIME 10.2 g 3    predniSONE (DELTASONE) 10 MG tablet 4 daily for 2 days, then 3 daily for 2 days, then 2 daily for 2 days, then 1 daily for 2 days, then 1/2 daily for 2days.  21 tablet 0    albuterol sulfate HFA (PROVENTIL;VENTOLIN;PROAIR) 108 (90 Base) MCG/ACT inhaler INHALE 2 PUFFS INTO THE LUNGS EVERY 6 HOURS AS NEEDED FOR WHEEZING 6.7 g 3    sertraline (ZOLOFT) 100 MG tablet TAKE 1 AND 1/2 TABLETS BY MOUTH DAILY 45 tablet 5    tiotropium (SPIRIVA RESPIMAT) 1.25 MCG/ACT AERS inhaler Inhale 2 puffs into the lungs daily 2 each 0    montelukast (SINGULAIR) 10 MG tablet TAKE 1 TABLET BY MOUTH EVERY NIGHT 30 tablet 5    omeprazole (PRILOSEC) 20 MG delayed release capsule Take 20 mg by mouth daily      hydroCHLOROthiazide (HYDRODIURIL) 25 MG tablet TAKE 1 TABLET BY MOUTH DAILY 30 tablet 5    atorvastatin (LIPITOR) 20 MG tablet TAKE 1 TABLET BY MOUTH DAILY 30 tablet 5    Cholecalciferol (VITAMIN D3) 2000 UNITS CAPS Take 2,000 Units by mouth daily       No current facility-administered medications for this visit. Social History:  Social History     Socioeconomic History    Marital status:      Spouse name: Not on file    Number of children: Not on file    Years of education: Not on file    Highest education level: Not on file   Occupational History    Occupation: billing   Tobacco Use    Smoking status: Never    Smokeless tobacco: Never   Vaping Use    Vaping Use: Never used   Substance and Sexual Activity    Alcohol use: Not Currently     Comment: moderate-varies    Drug use: No    Sexual activity: Not Currently   Other Topics Concern    Not on file   Social History Narrative    Not on file     Social Determinants of Health     Financial Resource Strain: Not on file   Food Insecurity: Not on file   Transportation Needs: Not on file   Physical Activity: Not on file   Stress: Not on file   Social Connections: Not on file   Intimate Partner Violence: Not on file   Housing Stability: Not on file       Allergies:   Allergies   Allergen Reactions    Elemental Sulfur Anaphylaxis and Other (See Comments)     Sores in mouth, throat closed    Doxycycline     Sulfa Antibiotics        Physical Exam:  BP (!) 142/85   Pulse 75   Temp 97.1 °F (36.2 °C)   Ht 5' 2\" (1.575 m)   Wt 171 lb 3.2 oz (77.7 kg)   SpO2 100%   BMI 31.31 kg/m² Physical Exam  Constitutional:       General: She is not in acute distress. Appearance: She is not ill-appearing, toxic-appearing or diaphoretic. HENT:      Head: Normocephalic. Nose: Nose normal.      Mouth/Throat:      Mouth: Mucous membranes are moist.   Eyes:      General: No scleral icterus. Extraocular Movements: Extraocular movements intact. Cardiovascular:      Rate and Rhythm: Normal rate and regular rhythm. Heart sounds: No murmur heard. Pulmonary:      Effort: Pulmonary effort is normal. No respiratory distress. Breath sounds: No stridor. Wheezing (Very faint at bases, but much improved since last follow-up) present. Abdominal:      General: There is no distension. Palpations: There is no mass. Tenderness: There is no abdominal tenderness. Musculoskeletal:         General: No swelling. Normal range of motion. Cervical back: Normal range of motion. No rigidity. Lymphadenopathy:      Cervical: No cervical adenopathy. Skin:     General: Skin is warm. Findings: No lesion or rash. Neurological:      General: No focal deficit present. Mental Status: She is oriented to person, place, and time. Psychiatric:         Mood and Affect: Mood normal.         Behavior: Behavior normal.         Thought Content: Thought content normal.       ECOG PS 0    9/13/2022:   IgG1 = 572  IgG2 = 184  IgG 3 = 4 (low)  IgG4 = 29    10/31/2022  Total IgG 873  IgG 2 = 146 (low)  IgG3 = 6 (low)    12/20/2022  IgA 184, IgG 851, IgM 173, IgE 28  IgG 1 = 471  IgG2 = 119 (low)  IgG 3 = 3 (low)  IgG4 = 23.8      ASSESSMENT:    IgG3 deficiency  IgG2 deficiency  Recurrent sinopulmonary infections  Hypertension    The patient is a 60 y.o. female who established me on 2/6/2023, after referral from PCP for IgG3 deficiency. She also was found to have low levels of IgG2 subset.   The patient reports since 2021 since her COVID-19 infection, she has had recurrent respiratory symptoms, and reported recurrent pneumonias. She has required frequent courses of prednisone, in which patient reports almost every 5-6 weeks. She had a hospital admission in September 2022. Further work-up noted a decrease of IgG3 level (see above), when evaluated by pulmonology. This was also further evaluated with hematology/oncology at the [facility]. There were recommendations to initiate IVIG. Patient also has seen immunology with Dr. Val Charles. Work-up included repeat immunoglobulin levels, which did not note for persistent IgG3 levels. Patient states that some the testing may have been done while she was on prednisone. Her work-up included hypersensitivity labs, ANCA screen, complement levels, Streptococcus pneumoniae serologies, CD4/CD8 levels, all which were largely unremarkable (See Media, dated 1/25/2023). And also h ad diphtheria and tetanus antitoxin titers checked, which were within normal range. Per chart review she had Prevnar 20 on 12/28/2022. She is also started shingles vaccination series. Patient established with me on 2/6/2023, she had not started IVIG or other therapies specifically focused on treating her IgG subtype 3 process, and she has also completed a course of steroids at least a few weeks prior to seeing me at that time. Patient is also established with allergy/immunology with Dr. Apple Rodgers. I discussed case. We had reviewed her labs, noting appropriate antibody response from her vaccines, which titers were drawn a few weeks after administration. PLAN:  Patient has completed a course of prednisone per PCP. She had responded well, and appears well today, and also much less wheezy on exam.  Wheezing is substantially decreased today  Also of note, I have spoken with her allergist/immunologist, regarding assessment and plan. We agreed that patient could trial with IVIG. I have placed an orders earlier, but they have been denied.   Currently working with our financial department, and resubmitted case, for possible approval.  We will follow-up. Otherwise I advised patient to follow-up with Dr. Marc Cavanaugh  And also to follow-up with pulmonology, in which she is scheduled next month on 4/6/2023. If IVIG does not work, I would recommend closer follow up and possible more intensive workup with pulmonology. DISPO:   RTC in about 10 days with labs and possible IVIG      Thank you for allowing us to participate in the care of Pro Cole       Approximately spent 31 minutes on chart review as well as time spent on patient encounter, discussing the laboratory, imaging, and clinical findings; and documentation. I have discussed clinical implications and recommendations on the patient's primary issues. More than 50% of time was spent counseling patient. The patient verbalized understanding.       [address] 03/06/23 9:55 AM    [facility]) Office  P: [phone]  F: [phone]    Aysha Lo) Office  P: [phone]  F: [phone] Alone

## 2023-03-06 NOTE — TELEPHONE ENCOUNTER
[facility], [phone] and per Cornelia Munson. \"This redetermination is still not in our system, It can take 24 to 48 hours to be entered in the system and then another five more days in order to make a decision. \" Voiced understanding, case was faxed in on 3-2-23 so await further information.

## 2023-03-08 ENCOUNTER — TELEPHONE (OUTPATIENT)
Dept: INFUSION THERAPY | Age: 63
End: 2023-03-08

## 2023-03-08 NOTE — TELEPHONE ENCOUNTER
[address], [phone], with regards to reconsideration faxed to [phone] on March 2rd with a new diagnosis and more clinicals for case denial #[id number] for Gamunex-C. Stephon Pennie Everfeliberto @8415, stated \"I am going to be completely honest with you, we are twenty one days behind on having the cases entered into our mainframe, so your case is not in the system yet since you faxed it in on March 2. It will be at least twenty days before that is in our system to even look at the reconsideration and then it will be even longer to review the case. \" Voiced understanding and Notified Dr. Christina Gomes. No new orders at this time, will continue to monitor and await a determination from Gabrielle ospina.

## 2023-03-08 NOTE — TELEPHONE ENCOUNTER
Left patient a message to please return call to [phone] with regards to her IVIG treatment and a progress update per Dr. Eubanks Heart request. Glory Churchill a return call.

## 2023-03-08 NOTE — TELEPHONE ENCOUNTER
Patient returned call regarding update for her IVIG. She voiced understanding that it is still in progress with [employer] and that it may take twenty one days, just for it to be placed in their system. Patient was very appreciative and grateful to staff. Will continue to monitor for this case determination. Also, left a message for Jennifer MITCHELL To please return call to [phone] for an update as well.

## 2023-03-09 ENCOUNTER — OFFICE VISIT (OUTPATIENT)
Dept: PRIMARY CARE CLINIC | Age: 63
End: 2023-03-09

## 2023-03-09 ENCOUNTER — TELEPHONE (OUTPATIENT)
Dept: INFUSION THERAPY | Age: 63
End: 2023-03-09

## 2023-03-09 VITALS
RESPIRATION RATE: 14 BRPM | SYSTOLIC BLOOD PRESSURE: 120 MMHG | OXYGEN SATURATION: 96 % | TEMPERATURE: 97.5 F | DIASTOLIC BLOOD PRESSURE: 78 MMHG | HEIGHT: 62 IN | WEIGHT: 171 LBS | HEART RATE: 76 BPM | BODY MASS INDEX: 31.47 KG/M2

## 2023-03-09 DIAGNOSIS — J45.901 ACUTE EXACERBATION OF EXTRINSIC ASTHMA: Primary | ICD-10-CM

## 2023-03-09 DIAGNOSIS — D80.3 IGG3 DEFICIENCY (HCC): ICD-10-CM

## 2023-03-09 DIAGNOSIS — R06.2 WHEEZING: ICD-10-CM

## 2023-03-09 RX ORDER — PREDNISONE 10 MG/1
TABLET ORAL
Qty: 21 TABLET | Refills: 0 | Status: SHIPPED | OUTPATIENT
Start: 2023-03-09

## 2023-03-09 SDOH — ECONOMIC STABILITY: FOOD INSECURITY: WITHIN THE PAST 12 MONTHS, THE FOOD YOU BOUGHT JUST DIDN'T LAST AND YOU DIDN'T HAVE MONEY TO GET MORE.: NEVER TRUE

## 2023-03-09 SDOH — ECONOMIC STABILITY: FOOD INSECURITY: WITHIN THE PAST 12 MONTHS, YOU WORRIED THAT YOUR FOOD WOULD RUN OUT BEFORE YOU GOT MONEY TO BUY MORE.: NEVER TRUE

## 2023-03-09 SDOH — ECONOMIC STABILITY: HOUSING INSECURITY
IN THE LAST 12 MONTHS, WAS THERE A TIME WHEN YOU DID NOT HAVE A STEADY PLACE TO SLEEP OR SLEPT IN A SHELTER (INCLUDING NOW)?: NO

## 2023-03-09 SDOH — ECONOMIC STABILITY: INCOME INSECURITY: HOW HARD IS IT FOR YOU TO PAY FOR THE VERY BASICS LIKE FOOD, HOUSING, MEDICAL CARE, AND HEATING?: NOT HARD AT ALL

## 2023-03-09 ASSESSMENT — ENCOUNTER SYMPTOMS
COUGH: 1
RHINORRHEA: 1
SHORTNESS OF BREATH: 1
WHEEZING: 1

## 2023-03-09 NOTE — TELEPHONE ENCOUNTER
Left a message for patient to please return call to [phone] with regards to her IVIG treatment. Awaiting a return call.

## 2023-03-09 NOTE — PROGRESS NOTES
Leroy Landry, a female of 60 y.o. came to the office 3/9/2023. Patient Active Problem List   Diagnosis    DCIS (ductal carcinoma in situ) of breast    Personal history of breast cancer    Primary hypertension    Pure hypercholesterolemia    Pneumonia    Acute respiratory failure with hypoxia (HCC)    Asthma    IgG3 deficiency ([address]    Hypogammaglobulinemia ([facility] [address]          Cough  This is a new problem. The current episode started in the past 7 days. The problem has been gradually worsening. The cough is Non-productive. Associated symptoms include rhinorrhea, shortness of breath (with stairs.) and wheezing. Pertinent negatives include no chills or fever. She has tried a beta-agonist inhaler for the symptoms. The treatment provided mild relief. Wheezing   This is a new problem. The current episode started in the past 7 days (3). The problem has been gradually worsening. Associated symptoms include coughing, rhinorrhea and shortness of breath (with stairs. ). Pertinent negatives include no chills or fever. She has tried oral steroids (finished taper dose on 2/22.) for the symptoms. Other  Associated symptoms include coughing. Pertinent negatives include no chills or fever. IgG 3 def: iv IgG treatment denied by insur presently so in review. Appt with pulm in April.      Allergies   Allergen Reactions    Elemental Sulfur Anaphylaxis and Other (See Comments)     Sores in mouth, throat closed    Doxycycline     Sulfa Antibiotics        Current Outpatient Medications on File Prior to Visit   Medication Sig Dispense Refill    SYMBICORT 160-4.5 MCG/ACT AERO INHALE 2 PUFFS INTO THE LUNGS EVERY NIGHT IN THE MORNING AND AT BEDTIME 10.2 g 3    albuterol sulfate HFA (PROVENTIL;VENTOLIN;PROAIR) 108 (90 Base) MCG/ACT inhaler INHALE 2 PUFFS INTO THE LUNGS EVERY 6 HOURS AS NEEDED FOR WHEEZING 6.7 g 3    sertraline (ZOLOFT) 100 MG tablet TAKE 1 AND 1/2 TABLETS BY MOUTH DAILY (Patient taking differently: Take 100 mg by mouth daily) 45 tablet 5    tiotropium (SPIRIVA RESPIMAT) 1.25 MCG/ACT AERS inhaler Inhale 2 puffs into the lungs daily 2 each 0    montelukast (SINGULAIR) 10 MG tablet TAKE 1 TABLET BY MOUTH EVERY NIGHT 30 tablet 5    omeprazole (PRILOSEC) 20 MG delayed release capsule Take 20 mg by mouth daily      hydroCHLOROthiazide (HYDRODIURIL) 25 MG tablet TAKE 1 TABLET BY MOUTH DAILY 30 tablet 5    atorvastatin (LIPITOR) 20 MG tablet TAKE 1 TABLET BY MOUTH DAILY 30 tablet 5    Cholecalciferol (VITAMIN D3) 2000 UNITS CAPS Take 2,000 Units by mouth daily       No current facility-administered medications on file prior to visit. Review of Systems   Constitutional:  Negative for chills and fever. HENT:  Positive for rhinorrhea. Respiratory:  Positive for cough, shortness of breath (with stairs.) and wheezing. other review of systems reviewed and are negative    OBJECTIVE:  /78   Pulse 76   Temp 97.5 °F (36.4 °C)   Resp 14   Ht 5' 2\" (1.575 m)   Wt 171 lb (77.6 kg)   SpO2 96%   BMI 31.28 kg/m²      Physical Exam  Constitutional:       General: She is not in acute distress. Appearance: Normal appearance. She is not ill-appearing, toxic-appearing or diaphoretic. HENT:      Head: Normocephalic. Eyes:      General: No scleral icterus. Pulmonary:      Effort: Pulmonary effort is normal.      Breath sounds: Wheezing (at end expiratory) present. Lymphadenopathy:      Cervical: No cervical adenopathy. Neurological:      Mental Status: She is alert and oriented to person, place, and time. Psychiatric:         Mood and Affect: Mood normal.       ASSESSMENT AND PLAN:    Claudetta Rainbow was seen today for cough, wheezing and other. Diagnoses and all orders for this visit:    Acute exacerbation of extrinsic asthma  -     predniSONE (DELTASONE) 10 MG tablet; 4 daily for 2 days, then 3 daily for 2 days, then 2 daily for 2 days, then 1 daily for 2 days, then 1/2 daily for 2days.     Wheezing  -     predniSONE (DELTASONE) 10 MG tablet; 4 daily for 2 days, then 3 daily for 2 days, then 2 daily for 2 days, then 1 daily for 2 days, then 1/2 daily for 2days. IgG3 deficiency ([facility] [address]  - continue follow up with hematology  - see pulm as scheduled. Return if symptoms worsen or fail to improve, for or for acute problem.     Sharon Mcdaniels, DO

## 2023-03-09 NOTE — TELEPHONE ENCOUNTER
Patient returned call regarding her IVIG treatment. Updated patient about a possible program called [facility], [phone], per Jamil LOCKE, to attempt to see if any assistance is available. Also, suggested that patient could try to call member services for [employer], her insurance, to see if she could get the case to move forward faster. Patient voiced understanding and was very grateful for the information.

## 2023-03-10 ENCOUNTER — TELEPHONE (OUTPATIENT)
Dept: ONCOLOGY | Age: 63
End: 2023-03-10

## 2023-03-10 ENCOUNTER — TELEPHONE (OUTPATIENT)
Dept: INFUSION THERAPY | Age: 63
End: 2023-03-10

## 2023-03-10 NOTE — TELEPHONE ENCOUNTER
Patient had called in regarding her IVIG denial after she has spoken to her insurance. Per the representative, the office may schedule a peer to peer reconsideration by calling [phone]. Voiced understanding and notified Dr. Anirudh Almazan. He voiced understanding and requested a peer to peer. [employer] and spoke to several representatives. A peer to peer reconsideration is scheduled for 1330 today. Dr. Anirudh Almazan voiced understanding.

## 2023-03-10 NOTE — TELEPHONE ENCOUNTER
Dr. Jesus Chu completed a peer to peer reconsideration with regards to patient's IVIG. Awaiting outcome.

## 2023-03-11 NOTE — TELEPHONE ENCOUNTER
Peer to peer with Gabrielle ospina was done earlier today. I spoke with allergy/immunology specialist regarding approval for IVIG. We discussed case, and after prolonged discussion, IVIG will not be approved based on laboratory workup collected. We further discussed what may be considered as a reactive airway taking into account patient's significant response to prednisone. And I was recommended to consider further investigating the nature of her symptomatology/pathology, which can be worked up under the allergy/immunology field. I was also offered to consider an appeal for IVIG approval.    Will reach out to patient's local immunologist for updates. Again patient may need further assessment with pulmonology as well.

## 2023-03-13 ENCOUNTER — TELEPHONE (OUTPATIENT)
Dept: INFUSION THERAPY | Age: 63
End: 2023-03-13

## 2023-03-13 RX ORDER — HYDROCHLOROTHIAZIDE 25 MG/1
25 TABLET ORAL DAILY
Qty: 30 TABLET | Refills: 5 | Status: SHIPPED | OUTPATIENT
Start: 2023-03-13

## 2023-03-13 RX ORDER — ATORVASTATIN CALCIUM 20 MG/1
TABLET, FILM COATED ORAL
Qty: 30 TABLET | Refills: 5 | Status: SHIPPED | OUTPATIENT
Start: 2023-03-13

## 2023-03-13 NOTE — TELEPHONE ENCOUNTER
Received phone call from Brittnee Aguila regarding any updates to getting her treatment authorized. I stated that Dr. Florence Cabello did complete a peer to peer on Friday and unfortunately was unsuccessful. The next step would be an appeal.  Brittnee Ayla verbalized understanding and stated that she was just anxious and wanted and update. I explained that we would exhaust every avenue available to us and keep her updated. Brittnee Aguila was appreciative of the information.

## 2023-03-14 ENCOUNTER — TELEPHONE (OUTPATIENT)
Dept: INFUSION THERAPY | Age: 63
End: 2023-03-14

## 2023-03-14 DIAGNOSIS — J96.01 ACUTE RESPIRATORY FAILURE WITH HYPOXIA (HCC): ICD-10-CM

## 2023-03-14 DIAGNOSIS — D80.3 IGG3 DEFICIENCY (HCC): ICD-10-CM

## 2023-03-14 DIAGNOSIS — D80.1 HYPOGAMMAGLOBULINEMIA (HCC): Primary | ICD-10-CM

## 2023-03-14 NOTE — TELEPHONE ENCOUNTER
Jose C Frias., returned call from [facility] Paige Cassidy, and stated, \"This plan will never cover any IVIG products at all, any oral chemotherapy's, never covers any targeted therapy's and very limited coverage on any IV therapy's and or chemotherapy's. For chemotherapy's the process can take several weeks, for very limited, case by case, approvals by a case a manager assisted by the insurance plan. The patient must apply for financial assistance and the treatment facility must be willing to work with the patients. \" Voiced understanding. Awaiting an e-mail for financial assistance information from Paige Cassidy. Will update financial navigation and Dr. Skyler Linton.

## 2023-03-14 NOTE — TELEPHONE ENCOUNTER
Attempted to apply for prior authorization under patient's pharmacy benefit for Gamunex-C due to denial under the medical benefit from [place] despite several attempts. Script placed into Epic, and sent to [facility] to start the prior authorization under the pharmacy benefit but per Maria Del Carmen Buckley., insurance requested a phone call. Called [facility] [phone], per Jony Treviño. Michelle Claire will not pay for this Gamunex-C () under the medical benefit or the pharmacy benefit, it is excluded for both. \" Voiced understanding. \"You may be able to call Andre Foy at [phone] or [facility] [phone], to see what options are available for treatment for this patient. \" Called both numbers, no answer at either, left messages for a return call. Awaiting a return call from either one.

## 2023-03-14 NOTE — TELEPHONE ENCOUNTER
Left a message for patient to please return call to [phone] with regards to her Gamunex-C. Awaiting a return call.

## 2023-03-14 NOTE — TELEPHONE ENCOUNTER
Patient returned call and updated of her insurance plan, Melanie Baker, [facility], will never pay for any IVIG, per Nikki DONATO. She voiced understanding and will attempt to call [facility] today to see if any assistance is available. Also, notified Dr. Sophia Cooper, RN , financial navigation, and manager Wilhelm Aurelia RN.

## 2023-03-16 ENCOUNTER — HOSPITAL ENCOUNTER (OUTPATIENT)
Dept: INFUSION THERAPY | Age: 63
Discharge: HOME | End: 2023-03-16

## 2023-03-16 ENCOUNTER — OFFICE VISIT (OUTPATIENT)
Dept: ONCOLOGY | Age: 63
End: 2023-03-16
Payer: COMMERCIAL

## 2023-03-16 VITALS
WEIGHT: 174.8 LBS | SYSTOLIC BLOOD PRESSURE: 135 MMHG | HEART RATE: 72 BPM | BODY MASS INDEX: 32.17 KG/M2 | TEMPERATURE: 97 F | DIASTOLIC BLOOD PRESSURE: 72 MMHG | OXYGEN SATURATION: 98 % | HEIGHT: 62 IN

## 2023-03-16 DIAGNOSIS — D80.3 IGG3 DEFICIENCY (HCC): Primary | ICD-10-CM

## 2023-03-16 PROCEDURE — 99214 OFFICE O/P EST MOD 30 MIN: CPT | Performed by: STUDENT IN AN ORGANIZED HEALTH CARE EDUCATION/TRAINING PROGRAM

## 2023-03-16 PROCEDURE — 3078F DIAST BP <80 MM HG: CPT | Performed by: STUDENT IN AN ORGANIZED HEALTH CARE EDUCATION/TRAINING PROGRAM

## 2023-03-16 PROCEDURE — 99212 OFFICE O/P EST SF 10 MIN: CPT

## 2023-03-16 PROCEDURE — 3075F SYST BP GE 130 - 139MM HG: CPT | Performed by: STUDENT IN AN ORGANIZED HEALTH CARE EDUCATION/TRAINING PROGRAM

## 2023-03-16 ASSESSMENT — ENCOUNTER SYMPTOMS
DIARRHEA: 0
SHORTNESS OF BREATH: 0
COUGH: 0
SORE THROAT: 0

## 2023-03-16 NOTE — PROGRESS NOTES
[id number] [facility]  [address] [place] [address]  [place] [address]  Dept: [phone]  Loc: [phone]  Clinic Progress Note    Referring Provider:  Ken Cisneros DO    Reason for Visit:   IgG2 and IgG3 deficiencies, here to discuss further management    PCP:  Ken Cisneros DO    Demographics: 60 y.o. female    Chief Complaint:   Chief Complaint   Patient presents with    Anemia       Subjective:  Patient returns. She was recently started on a new course of prednisone. Her respiratory symptoms are better controlled since starting. Otherwise she appears well, comfortable.  was present with us today. She complains of gaining 30 lbs since repeatedly taking prednisone. HPI from Initial Outpatient Consultation (2/6/23): The patient is a 60 y.o. 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 Since 2021 when she had COVID-19, she has had recurrent pulmonary symptoms and pneumonia, and reports that she has been on frequent courses of prednisone. She states of late, she has been taking 10 days worth of prednisone almost every 6 weeks. In September 2022, she was hospitalized for acute respiratory failure, she was found to have atypical pneumonia. During his hospital course and was found that she had an IgG subtype 3 level of 4. This prompted further evaluation, which patient had been seen by hematology/oncology at the [facility]. Repeat immunoglobulin levels were done again in October and December 2022, showed persistent decreases in IgG 3 subclass, as well as a decrease in IgG 2 subclass. Today, patient was accompanied by her . She does have shortness of breath again. She reports last course of prednisone was completed around mid January few weeks ago. She denies of fevers or chills, unintended weight loss, sore throat, dysuria, diarrhea, skin infections, or lymphadenopathy. Does have very mild sinus symptoms, but states that her respiratory symptoms are more prominent. She has also had some fluctuations of her weight given being on frequent intermittent prednisone use. Apart from prednisone and recent hospitalization/antibiotics, she has not started treatment for her decreased IgG subset levels. She had already been seen by allergy/immunology under the care of Dr. Harrison Kurtz, currently undergoing active work-up. Patient had pneumonia vaccine in December. Review of Systems; Review of Systems   Constitutional:  Negative for fever. HENT: Negative. Negative for sore throat. Eyes:  Negative for visual disturbance. Respiratory:  Negative for cough (Improved after taking prednisone) and shortness of breath (Improved after taking prednisone). Cardiovascular:  Negative for chest pain and palpitations. Gastrointestinal:  Negative for diarrhea. Genitourinary:  Negative for dysuria and urgency. Musculoskeletal: Negative. Skin:  Negative for rash. Neurological: Negative. Hematological:  Negative for adenopathy. Does not bruise/bleed easily. Psychiatric/Behavioral: Negative.          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    Endometriosis     GERD (gastroesophageal reflux disease)     History of therapeutic radiation     Hx of Doppler ultrasound     10/01/1998 Normal Cardio Echo    Hyperlipidemia     Hypertension     Low vitamin D level     Osteopenia     Reactive airway disease 03/2020    Having symptoms intermittenly since March 2020    Vision disturbance     left eye - cataract     Patient Active Problem List   Diagnosis    DCIS (ductal carcinoma in situ) of breast    Personal history of breast cancer    Primary hypertension    Pure hypercholesterolemia    Pneumonia    Acute respiratory failure with hypoxia (HCC)    Asthma    IgG3 deficiency ([facility] [address] Hypogammaglobulinemia [facility])        Past Surgical History:      Procedure Laterality Date    BREAST BIOPSY Left 01/14/2013    DCIS    BREAST LUMPECTOMY      BREAST SURGERY Left 02/27/2013    DCIS Lumpectomy. Dr. Bhargav Woods  2010    Dr. Sue Pearce - normal    COLONOSCOPY  03/01/2017    Ray Cooper    EYE SURGERY      Cataract Surgery (Dr. Peg Cadena)- 07/2014     LAPAROSCOPY  1991    TONSILLECTOMY      UPPER GASTROINTESTINAL ENDOSCOPY  2005    Dr. Ray Cooper GERD    UPPER GASTROINTESTINAL ENDOSCOPY  04/15/2005       Family History:  Family History   Problem Relation Age of Onset    High Blood Pressure Mother     Heart Disease Father     High Cholesterol Father     High Blood Pressure Sister     High Cholesterol Sister     High Cholesterol Sister     High Blood Pressure Sister     High Blood Pressure Maternal Aunt     Arthritis Paternal Aunt     Diabetes Paternal Aunt     Heart Disease Paternal Aunt     High Blood Pressure Paternal Aunt     High Cholesterol Paternal Aunt     Stroke Paternal Aunt     Heart Disease Paternal Uncle     High Blood Pressure Paternal Uncle     Arthritis Maternal Grandmother     Arthritis Paternal Grandmother     Diabetes Paternal Grandmother     Heart Disease Paternal Grandmother     High Blood Pressure Paternal Grandmother     High Cholesterol Paternal Grandmother     Cancer Paternal Grandmother         lung    Kidney Disease Paternal Grandfather        Medications:  Reviewed and reconciled.   Current Outpatient Medications   Medication Sig Dispense Refill    Immune Globulin, Human, (GAMUNEX-C) 20 GM/200ML SOLN solution Infuse 20 g intravenously every 30 days for 6 doses 1200 mL 0    atorvastatin (LIPITOR) 20 MG tablet TAKE 1 TABLET BY MOUTH DAILY 30 tablet 5    hydroCHLOROthiazide (HYDRODIURIL) 25 MG tablet TAKE 1 TABLET BY MOUTH DAILY 30 tablet 5    predniSONE (DELTASONE) 10 MG tablet 4 daily for 2 days, then 3 daily for 2 days, then 2 daily for 2 days, then 1 daily for 2 days, then 1/2 daily for 2days. 21 tablet 0    SYMBICORT 160-4.5 MCG/ACT AERO INHALE 2 PUFFS INTO THE LUNGS EVERY NIGHT IN THE MORNING AND AT BEDTIME 10.2 g 3    albuterol sulfate HFA (PROVENTIL;VENTOLIN;PROAIR) 108 (90 Base) MCG/ACT inhaler INHALE 2 PUFFS INTO THE LUNGS EVERY 6 HOURS AS NEEDED FOR WHEEZING 6.7 g 3    sertraline (ZOLOFT) 100 MG tablet TAKE 1 AND 1/2 TABLETS BY MOUTH DAILY (Patient taking differently: Take 100 mg by mouth daily) 45 tablet 5    tiotropium (SPIRIVA RESPIMAT) 1.25 MCG/ACT AERS inhaler Inhale 2 puffs into the lungs daily 2 each 0    montelukast (SINGULAIR) 10 MG tablet TAKE 1 TABLET BY MOUTH EVERY NIGHT 30 tablet 5    omeprazole (PRILOSEC) 20 MG delayed release capsule Take 20 mg by mouth daily      Cholecalciferol (VITAMIN D3) 2000 UNITS CAPS Take 2,000 Units by mouth daily       No current facility-administered medications for this visit. Social History:  Social History     Socioeconomic History    Marital status:      Spouse name: Not on file    Number of children: Not on file    Years of education: Not on file    Highest education level: Not on file   Occupational History    Occupation: billing   Tobacco Use    Smoking status: Never    Smokeless tobacco: Never   Vaping Use    Vaping Use: Never used   Substance and Sexual Activity    Alcohol use: Not Currently     Comment: moderate-varies    Drug use: No    Sexual activity: Not Currently   Other Topics Concern    Not on file   Social History Narrative    Not on file     Social Determinants of Health     Financial Resource Strain: Low Risk     Difficulty of Paying Living Expenses: Not hard at all   Food Insecurity: No Food Insecurity    Worried About Running Out of Food in the Last Year: Never true    [address] in the Last Year: Never true   Transportation Needs: Unknown    Lack of Transportation (Medical): Not on file    Lack of Transportation (Non-Medical):  No   Physical Activity: Not on file   Stress: Not on file   Social Connections: Not on file   Intimate Partner Violence: Not on file   Housing Stability: Unknown    Unable to Pay for Housing in the Last Year: Not on file    Number of Places Lived in the Last Year: Not on file    Unstable Housing in the Last Year: No       Allergies: Allergies   Allergen Reactions    Elemental Sulfur Anaphylaxis and Other (See Comments)     Sores in mouth, throat closed    Doxycycline     Sulfa Antibiotics        Physical Exam:  /72   Pulse 72   Temp 97 °F (36.1 °C)   Ht 5' 2\" (1.575 m)   Wt 174 lb 12.8 oz (79.3 kg)   SpO2 98%   BMI 31.97 kg/m²   Physical Exam  Constitutional:       General: She is not in acute distress. Appearance: She is not ill-appearing, toxic-appearing or diaphoretic. HENT:      Head: Normocephalic. Nose: Nose normal.      Mouth/Throat:      Mouth: Mucous membranes are moist.   Eyes:      General: No scleral icterus. Extraocular Movements: Extraocular movements intact. Cardiovascular:      Rate and Rhythm: Normal rate and regular rhythm. Heart sounds: No murmur heard. Pulmonary:      Effort: Pulmonary effort is normal. No respiratory distress. Breath sounds: No stridor. No wheezing. Abdominal:      General: There is no distension. Musculoskeletal:         General: No swelling. Normal range of motion. Cervical back: Normal range of motion. Skin:     General: Skin is warm. Coloration: Skin is not jaundiced or pale. Findings: No lesion or rash. Neurological:      General: No focal deficit present. Mental Status: She is oriented to person, place, and time. Psychiatric:         Mood and Affect: Mood normal.         Behavior: Behavior normal.         Thought Content:  Thought content normal.       ECOG PS 0    9/13/2022:   IgG1 = 572  IgG2 = 184  IgG 3 = 4 (low)  IgG4 = 29    10/31/2022  Total IgG 873  IgG 2 = 146 (low)  IgG3 = 6 (low)    12/20/2022  IgA 184, IgG 851, IgM 173, IgE 28  IgG 1 = 471  IgG2 = 119 (low)  IgG 3 = 3 (low)  IgG4 = 23.8      ASSESSMENT:    IgG3 deficiency  IgG2 deficiency  Possible reactive airway  Recurrent sinopulmonary infections  Hypertension    The patient is a 60 y.o. female who established me on 2/6/2023, after referral from PCP for IgG3 deficiency. She also was found to have low levels of IgG2 subset. The patient reports since 2021 since her COVID-19 infection, she has had recurrent respiratory symptoms, and reported recurrent pneumonias. She has required frequent courses of prednisone, in which patient reports almost every 5-6 weeks. She had a hospital admission in September 2022. Further work-up noted a decrease of IgG3 level (see above), when evaluated by pulmonology. This was also further evaluated with hematology/oncology at the [facility]. There were recommendations to initiate IVIG. Patient also has seen immunology with Dr. Wood Kaur. Work-up included repeat immunoglobulin levels, which did not note for persistent IgG3 levels. Patient states that some the testing may have been done while she was on prednisone. Her work-up included hypersensitivity labs, ANCA screen, complement levels, Streptococcus pneumoniae serologies, CD4/CD8 levels, all which were largely unremarkable (See Media, dated 1/25/2023). And also h ad diphtheria and tetanus antitoxin titers checked, which were within normal range. Per chart review she had Prevnar 20 on 12/28/2022. She is also started shingles vaccination series. Patient established with me on 2/6/2023, she had not started IVIG or other therapies specifically focused on treating her IgG subtype 3 process, and she has also completed a course of steroids at least a few weeks prior to seeing me at that time. Patient is also established with allergy/immunology with Dr. Maximo Solorio. I discussed case. We had reviewed her labs, noting appropriate antibody response from her vaccines, which titers were drawn a few weeks after administration. PLAN:  After multiple attempts and exhaustive efforts from our financial team, patient has been denied IVIG therapies. She again was started on a course of prednisone, which has relieved her symptoms. She complains of a 30 pound weight gain since repeated courses of steroids. I suggested patient reach out to allergy/immunology for a follow-up. I also offered to contact her allergist/immunologist, Dr. David Dukes for further discussion. In addition to the insurance denial of IVIG, I also had a peer to peer discussion with allergy/immunology representing her insurance company. Together we had reviewed reasoning for and against IVIG at length. I was also recommended to consider exploring a possible reactive airway disease process, which can also be further evaluated by allergy/immunology. Patient is currently asymptomatic due to the recent prednisone course. I did offer option to proceed with the costs, and would continue to have our financial assess a possible assistance plan after she is billed. Patient wishes to hold off for now and is wanting to navigate among her specialists. She is scheduled to follow up with pulmonology on 4/6/2023. Will be on standby and secure a short interval follow up. DISPO:   Will secure a follow up visit in 4 weeks      Thank you for allowing us to participate in the care of Laurie Wheeler       Approximately spent 32 minutes on chart review as well as time spent on patient encounter, discussing the laboratory, imaging, and clinical findings; and documentation. I have discussed clinical implications and recommendations on the patient's primary issues. More than 50% of time was spent counseling patient. The patient verbalized understanding.       [address] 03/16/23 9:38 AM    [facility]) Office  P: [phone]  F: [phone]    [address]) Office  P: [phone]  F: [phone]

## 2023-03-20 ENCOUNTER — TELEPHONE (OUTPATIENT)
Dept: ONCOLOGY | Age: 63
End: 2023-03-20

## 2023-03-20 NOTE — TELEPHONE ENCOUNTER
Discussed case with Dr. Abran Lai with allergy/immunology this morning, noting insurance will not approve of IVIG. We discussed further exploring patient's possible reactive airway disease, from an allergy and immunology standpoint. We agreed this would likely be the next step, and Dr. Abran Lai noted she could consider explore if pt would be a candidate for another agent/biologic. Her team will reach out for a follow up appt.

## 2023-03-24 ENCOUNTER — TELEPHONE (OUTPATIENT)
Dept: PRIMARY CARE CLINIC | Age: 63
End: 2023-03-24

## 2023-03-24 DIAGNOSIS — D80.3 IGG3 DEFICIENCY (HCC): ICD-10-CM

## 2023-03-24 DIAGNOSIS — J45.901 ACUTE EXACERBATION OF EXTRINSIC ASTHMA: Primary | ICD-10-CM

## 2023-03-24 DIAGNOSIS — R05.3 CHRONIC COUGH: ICD-10-CM

## 2023-03-24 NOTE — TELEPHONE ENCOUNTER
Pt called and wanted to know if a referral can be sent to Dr. Dinora Mark on [facility] [place] for pulmonology. Please advise, pt would like a call back to confirm referral was sent. Please advise.

## 2023-05-18 ENCOUNTER — HOSPITAL ENCOUNTER (OUTPATIENT)
Dept: CT IMAGING | Age: 63
Discharge: HOME | End: 2023-05-20
Payer: COMMERCIAL

## 2023-05-18 ENCOUNTER — HOSPITAL ENCOUNTER (OUTPATIENT)
Dept: PULMONOLOGY | Age: 63
Discharge: HOME | End: 2023-05-18
Payer: COMMERCIAL

## 2023-05-18 DIAGNOSIS — J18.9 PNEUMONIA OF BOTH LOWER LOBES DUE TO INFECTIOUS ORGANISM: ICD-10-CM

## 2023-05-18 DIAGNOSIS — J45.52 SEVERE PERSISTENT ASTHMA WITH STATUS ASTHMATICUS: ICD-10-CM

## 2023-05-18 PROCEDURE — 94726 PLETHYSMOGRAPHY LUNG VOLUMES: CPT

## 2023-05-18 PROCEDURE — 94729 DIFFUSING CAPACITY: CPT

## 2023-05-18 PROCEDURE — 94060 EVALUATION OF WHEEZING: CPT

## 2023-05-18 PROCEDURE — 71250 CT THORAX DX C-: CPT

## 2023-07-10 RX ORDER — SERTRALINE HYDROCHLORIDE 100 MG/1
TABLET, FILM COATED ORAL
Qty: 45 TABLET | Refills: 5 | Status: SHIPPED | OUTPATIENT
Start: 2023-07-10

## 2023-08-03 ENCOUNTER — OFFICE VISIT (OUTPATIENT)
Dept: PRIMARY CARE CLINIC | Age: 63
End: 2023-08-03
Payer: COMMERCIAL

## 2023-08-03 VITALS
WEIGHT: 179 LBS | HEART RATE: 70 BPM | TEMPERATURE: 97.4 F | SYSTOLIC BLOOD PRESSURE: 118 MMHG | HEIGHT: 62 IN | RESPIRATION RATE: 16 BRPM | OXYGEN SATURATION: 97 % | DIASTOLIC BLOOD PRESSURE: 80 MMHG | BODY MASS INDEX: 32.94 KG/M2

## 2023-08-03 DIAGNOSIS — Z12.31 BREAST CANCER SCREENING BY MAMMOGRAM: ICD-10-CM

## 2023-08-03 DIAGNOSIS — F34.1 DYSTHYMIA: ICD-10-CM

## 2023-08-03 DIAGNOSIS — J45.42 MODERATE PERSISTENT ASTHMA WITH STATUS ASTHMATICUS: Primary | ICD-10-CM

## 2023-08-03 DIAGNOSIS — I10 PRIMARY HYPERTENSION: ICD-10-CM

## 2023-08-03 PROCEDURE — 99213 OFFICE O/P EST LOW 20 MIN: CPT | Performed by: FAMILY MEDICINE

## 2023-08-03 PROCEDURE — 3074F SYST BP LT 130 MM HG: CPT | Performed by: FAMILY MEDICINE

## 2023-08-03 PROCEDURE — 3079F DIAST BP 80-89 MM HG: CPT | Performed by: FAMILY MEDICINE

## 2023-08-03 ASSESSMENT — ENCOUNTER SYMPTOMS
COUGH: 0
DIFFICULTY BREATHING: 0
CHEST TIGHTNESS: 0
FREQUENT THROAT CLEARING: 0
WHEEZING: 0
SHORTNESS OF BREATH: 0
HEMOPTYSIS: 0
HOARSE VOICE: 1
SPUTUM PRODUCTION: 0

## 2023-09-01 ENCOUNTER — HOSPITAL ENCOUNTER (OUTPATIENT)
Dept: GENERAL RADIOLOGY | Age: 63
Discharge: HOME | End: 2023-09-01
Attending: FAMILY MEDICINE
Payer: COMMERCIAL

## 2023-09-01 VITALS — HEIGHT: 62 IN | WEIGHT: 179 LBS | BODY MASS INDEX: 32.94 KG/M2

## 2023-09-01 DIAGNOSIS — Z12.31 BREAST CANCER SCREENING BY MAMMOGRAM: ICD-10-CM

## 2023-09-01 PROCEDURE — 77063 BREAST TOMOSYNTHESIS BI: CPT

## 2023-09-07 RX ORDER — ATORVASTATIN CALCIUM 20 MG/1
TABLET, FILM COATED ORAL
Qty: 30 TABLET | Refills: 5 | OUTPATIENT
Start: 2023-09-07

## 2023-09-07 RX ORDER — ATORVASTATIN CALCIUM 20 MG/1
TABLET, FILM COATED ORAL
Qty: 30 TABLET | Refills: 5 | Status: SHIPPED | OUTPATIENT
Start: 2023-09-07

## 2023-09-07 RX ORDER — HYDROCHLOROTHIAZIDE 25 MG/1
25 TABLET ORAL DAILY
Qty: 30 TABLET | Refills: 5 | OUTPATIENT
Start: 2023-09-07

## 2023-09-07 RX ORDER — HYDROCHLOROTHIAZIDE 25 MG/1
25 TABLET ORAL DAILY
Qty: 30 TABLET | Refills: 5 | Status: SHIPPED | OUTPATIENT
Start: 2023-09-07

## 2024-02-02 ENCOUNTER — OFFICE VISIT (OUTPATIENT)
Dept: PRIMARY CARE CLINIC | Age: 64
End: 2024-02-02
Payer: COMMERCIAL

## 2024-02-02 VITALS
BODY MASS INDEX: 32.57 KG/M2 | RESPIRATION RATE: 16 BRPM | TEMPERATURE: 97.7 F | DIASTOLIC BLOOD PRESSURE: 84 MMHG | HEIGHT: 62 IN | SYSTOLIC BLOOD PRESSURE: 120 MMHG | HEART RATE: 79 BPM | OXYGEN SATURATION: 99 % | WEIGHT: 177 LBS

## 2024-02-02 DIAGNOSIS — I10 PRIMARY HYPERTENSION: Primary | ICD-10-CM

## 2024-02-02 DIAGNOSIS — J45.42 MODERATE PERSISTENT ASTHMA WITH STATUS ASTHMATICUS: ICD-10-CM

## 2024-02-02 DIAGNOSIS — E78.00 PURE HYPERCHOLESTEROLEMIA: ICD-10-CM

## 2024-02-02 LAB
ALBUMIN SERPL-MCNC: 4.4 G/DL (ref 3.5–5.2)
ALP BLD-CCNC: 96 U/L (ref 35–104)
ALT SERPL-CCNC: 18 U/L (ref 0–32)
ANION GAP SERPL CALCULATED.3IONS-SCNC: 14 MMOL/L (ref 7–16)
AST SERPL-CCNC: 17 U/L (ref 0–31)
BILIRUB SERPL-MCNC: 0.8 MG/DL (ref 0–1.2)
BUN BLDV-MCNC: 16 MG/DL (ref 6–23)
CALCIUM SERPL-MCNC: 10.2 MG/DL (ref 8.6–10.2)
CHLORIDE BLD-SCNC: 96 MMOL/L (ref 98–107)
CHOLESTEROL: 172 MG/DL
CO2: 28 MMOL/L (ref 22–29)
CREAT SERPL-MCNC: 0.9 MG/DL (ref 0.5–1)
GFR SERPL CREATININE-BSD FRML MDRD: >60 ML/MIN/1.73M2
GLUCOSE BLD-MCNC: 85 MG/DL (ref 74–99)
HCT VFR BLD CALC: 43.9 % (ref 34–48)
HDLC SERPL-MCNC: 61 MG/DL
HEMOGLOBIN: 13.9 G/DL (ref 11.5–15.5)
LDL CHOLESTEROL: 100 MG/DL
MCH RBC QN AUTO: 27.6 PG (ref 26–35)
MCHC RBC AUTO-ENTMCNC: 31.7 G/DL (ref 32–34.5)
MCV RBC AUTO: 87.1 FL (ref 80–99.9)
PDW BLD-RTO: 13.6 % (ref 11.5–15)
PLATELET # BLD: 329 K/UL (ref 130–450)
PMV BLD AUTO: 9.4 FL (ref 7–12)
POTASSIUM SERPL-SCNC: 4.2 MMOL/L (ref 3.5–5)
RBC # BLD: 5.04 M/UL (ref 3.5–5.5)
SODIUM BLD-SCNC: 138 MMOL/L (ref 132–146)
TOTAL PROTEIN: 7.8 G/DL (ref 6.4–8.3)
TRIGL SERPL-MCNC: 54 MG/DL
VLDLC SERPL CALC-MCNC: 11 MG/DL
WBC # BLD: 10.9 K/UL (ref 4.5–11.5)

## 2024-02-02 PROCEDURE — 3074F SYST BP LT 130 MM HG: CPT | Performed by: FAMILY MEDICINE

## 2024-02-02 PROCEDURE — 3079F DIAST BP 80-89 MM HG: CPT | Performed by: FAMILY MEDICINE

## 2024-02-02 PROCEDURE — 99213 OFFICE O/P EST LOW 20 MIN: CPT | Performed by: FAMILY MEDICINE

## 2024-02-02 PROCEDURE — 36415 COLL VENOUS BLD VENIPUNCTURE: CPT | Performed by: FAMILY MEDICINE

## 2024-02-02 ASSESSMENT — PATIENT HEALTH QUESTIONNAIRE - PHQ9
SUM OF ALL RESPONSES TO PHQ QUESTIONS 1-9: 0
3. TROUBLE FALLING OR STAYING ASLEEP: 0
5. POOR APPETITE OR OVEREATING: 0
6. FEELING BAD ABOUT YOURSELF - OR THAT YOU ARE A FAILURE OR HAVE LET YOURSELF OR YOUR FAMILY DOWN: 0
9. THOUGHTS THAT YOU WOULD BE BETTER OFF DEAD, OR OF HURTING YOURSELF: 0
7. TROUBLE CONCENTRATING ON THINGS, SUCH AS READING THE NEWSPAPER OR WATCHING TELEVISION: 0
8. MOVING OR SPEAKING SO SLOWLY THAT OTHER PEOPLE COULD HAVE NOTICED. OR THE OPPOSITE, BEING SO FIGETY OR RESTLESS THAT YOU HAVE BEEN MOVING AROUND A LOT MORE THAN USUAL: 0
SUM OF ALL RESPONSES TO PHQ QUESTIONS 1-9: 0
SUM OF ALL RESPONSES TO PHQ QUESTIONS 1-9: 0
2. FEELING DOWN, DEPRESSED OR HOPELESS: 0
10. IF YOU CHECKED OFF ANY PROBLEMS, HOW DIFFICULT HAVE THESE PROBLEMS MADE IT FOR YOU TO DO YOUR WORK, TAKE CARE OF THINGS AT HOME, OR GET ALONG WITH OTHER PEOPLE: 0
SUM OF ALL RESPONSES TO PHQ QUESTIONS 1-9: 0
1. LITTLE INTEREST OR PLEASURE IN DOING THINGS: 0
SUM OF ALL RESPONSES TO PHQ9 QUESTIONS 1 & 2: 0
4. FEELING TIRED OR HAVING LITTLE ENERGY: 0

## 2024-02-02 ASSESSMENT — ENCOUNTER SYMPTOMS
SHORTNESS OF BREATH: 0
COUGH: 0
CHEST TIGHTNESS: 0
SPUTUM PRODUCTION: 0
DIFFICULTY BREATHING: 0
WHEEZING: 0

## 2024-02-02 NOTE — PROGRESS NOTES
Sandra J Spalla, a female of 63 y.o. came to the office 2/2/2024.     Patient Active Problem List   Diagnosis    DCIS (ductal carcinoma in situ) of breast    Personal history of breast cancer    Primary hypertension    Pure hypercholesterolemia    Pneumonia    Acute respiratory failure with hypoxia (HCC)    Asthma    IgG3 deficiency (HCC)    Hypogammaglobulinemia (HCC)          Asthma  There is no chest tightness, cough, difficulty breathing, shortness of breath, sputum production or wheezing. This is a chronic problem. The current episode started more than 1 year ago. The problem occurs rarely. The problem has been rapidly improving. Associated symptoms include headaches (occas) and myalgias (occas in leg q couple wks hs but is tolerable.). Pertinent negatives include no chest pain or malaise/fatigue. Her symptoms are alleviated by steroid inhaler and beta-agonist. She reports significant improvement on treatment. Her past medical history is significant for asthma.   Hypertension  This is a chronic problem. The current episode started more than 1 year ago. The problem is controlled. Associated symptoms include headaches (occas). Pertinent negatives include no chest pain, malaise/fatigue, palpitations, peripheral edema or shortness of breath. Risk factors for coronary artery disease include dyslipidemia. Past treatments include diuretics. The current treatment provides significant improvement. There are no compliance problems.    Hyperlipidemia  This is a chronic problem. The current episode started more than 1 year ago. The problem is controlled. Associated symptoms include myalgias (occas in leg q couple wks hs but is tolerable.). Pertinent negatives include no chest pain, leg pain or shortness of breath. Current antihyperlipidemic treatment includes statins. The current treatment provides significant improvement of lipids. There are no compliance problems.     - Trelegy for asthma has been a game changer.

## 2024-03-05 RX ORDER — ATORVASTATIN CALCIUM 20 MG/1
TABLET, FILM COATED ORAL
Qty: 30 TABLET | Refills: 5 | OUTPATIENT
Start: 2024-03-05

## 2024-03-05 RX ORDER — HYDROCHLOROTHIAZIDE 25 MG/1
25 TABLET ORAL DAILY
Qty: 30 TABLET | Refills: 5 | OUTPATIENT
Start: 2024-03-05

## 2024-03-05 RX ORDER — HYDROCHLOROTHIAZIDE 25 MG/1
25 TABLET ORAL DAILY
Qty: 30 TABLET | Refills: 5 | Status: SHIPPED | OUTPATIENT
Start: 2024-03-05

## 2024-03-05 RX ORDER — ATORVASTATIN CALCIUM 20 MG/1
TABLET, FILM COATED ORAL
Qty: 30 TABLET | Refills: 5 | Status: SHIPPED | OUTPATIENT
Start: 2024-03-05

## 2024-06-13 ENCOUNTER — HOSPITAL ENCOUNTER (OUTPATIENT)
Dept: CT IMAGING | Age: 64
Discharge: HOME | End: 2024-06-15
Attending: INTERNAL MEDICINE
Payer: COMMERCIAL

## 2024-06-13 DIAGNOSIS — R91.8 MULTIPLE LUNG NODULES ON CT: ICD-10-CM

## 2024-06-13 PROCEDURE — 71250 CT THORAX DX C-: CPT

## 2024-08-01 SDOH — ECONOMIC STABILITY: FOOD INSECURITY: WITHIN THE PAST 12 MONTHS, YOU WORRIED THAT YOUR FOOD WOULD RUN OUT BEFORE YOU GOT MONEY TO BUY MORE.: NEVER TRUE

## 2024-08-01 SDOH — ECONOMIC STABILITY: FOOD INSECURITY: WITHIN THE PAST 12 MONTHS, THE FOOD YOU BOUGHT JUST DIDN'T LAST AND YOU DIDN'T HAVE MONEY TO GET MORE.: NEVER TRUE

## 2024-08-01 SDOH — ECONOMIC STABILITY: INCOME INSECURITY: HOW HARD IS IT FOR YOU TO PAY FOR THE VERY BASICS LIKE FOOD, HOUSING, MEDICAL CARE, AND HEATING?: NOT VERY HARD

## 2024-08-01 SDOH — ECONOMIC STABILITY: TRANSPORTATION INSECURITY
IN THE PAST 12 MONTHS, HAS LACK OF TRANSPORTATION KEPT YOU FROM MEETINGS, WORK, OR FROM GETTING THINGS NEEDED FOR DAILY LIVING?: NO

## 2024-08-02 ENCOUNTER — OFFICE VISIT (OUTPATIENT)
Dept: PRIMARY CARE CLINIC | Age: 64
End: 2024-08-02
Payer: COMMERCIAL

## 2024-08-02 VITALS
DIASTOLIC BLOOD PRESSURE: 70 MMHG | HEIGHT: 62 IN | TEMPERATURE: 97.7 F | RESPIRATION RATE: 16 BRPM | BODY MASS INDEX: 32.57 KG/M2 | HEART RATE: 82 BPM | OXYGEN SATURATION: 99 % | WEIGHT: 177 LBS | SYSTOLIC BLOOD PRESSURE: 130 MMHG

## 2024-08-02 DIAGNOSIS — I10 PRIMARY HYPERTENSION: Primary | ICD-10-CM

## 2024-08-02 DIAGNOSIS — L82.1 SEBORRHEIC KERATOSIS: ICD-10-CM

## 2024-08-02 DIAGNOSIS — Z12.31 BREAST CANCER SCREENING BY MAMMOGRAM: ICD-10-CM

## 2024-08-02 DIAGNOSIS — E78.00 PURE HYPERCHOLESTEROLEMIA: ICD-10-CM

## 2024-08-02 LAB
ALBUMIN: 4.2 G/DL (ref 3.5–5.2)
ALP BLD-CCNC: 88 U/L (ref 35–104)
ALT SERPL-CCNC: 14 U/L (ref 0–32)
ANION GAP SERPL CALCULATED.3IONS-SCNC: 11 MMOL/L (ref 7–16)
AST SERPL-CCNC: 15 U/L (ref 0–31)
BILIRUB SERPL-MCNC: 0.6 MG/DL (ref 0–1.2)
BUN BLDV-MCNC: 16 MG/DL (ref 6–23)
CALCIUM SERPL-MCNC: 10.2 MG/DL (ref 8.6–10.2)
CHLORIDE BLD-SCNC: 98 MMOL/L (ref 98–107)
CHOLESTEROL, TOTAL: 160 MG/DL
CO2: 31 MMOL/L (ref 22–29)
CREAT SERPL-MCNC: 0.8 MG/DL (ref 0.5–1)
GFR, ESTIMATED: 85 ML/MIN/1.73M2
GLUCOSE BLD-MCNC: 119 MG/DL (ref 74–99)
HCT VFR BLD CALC: 43.8 % (ref 34–48)
HDLC SERPL-MCNC: 57 MG/DL
HEMOGLOBIN: 14 G/DL (ref 11.5–15.5)
LDL CHOLESTEROL: 93 MG/DL
MCH RBC QN AUTO: 28.5 PG (ref 26–35)
MCHC RBC AUTO-ENTMCNC: 32 G/DL (ref 32–34.5)
MCV RBC AUTO: 89 FL (ref 80–99.9)
PDW BLD-RTO: 14.1 % (ref 11.5–15)
PLATELET # BLD: 311 K/UL (ref 130–450)
PMV BLD AUTO: 9.8 FL (ref 7–12)
POTASSIUM SERPL-SCNC: 3.9 MMOL/L (ref 3.5–5)
RBC # BLD: 4.92 M/UL (ref 3.5–5.5)
SODIUM BLD-SCNC: 140 MMOL/L (ref 132–146)
TOTAL PROTEIN: 7.7 G/DL (ref 6.4–8.3)
TRIGL SERPL-MCNC: 52 MG/DL
VLDLC SERPL CALC-MCNC: 10 MG/DL
WBC # BLD: 9.7 K/UL (ref 4.5–11.5)

## 2024-08-02 PROCEDURE — 3078F DIAST BP <80 MM HG: CPT | Performed by: FAMILY MEDICINE

## 2024-08-02 PROCEDURE — 3075F SYST BP GE 130 - 139MM HG: CPT | Performed by: FAMILY MEDICINE

## 2024-08-02 PROCEDURE — 99213 OFFICE O/P EST LOW 20 MIN: CPT | Performed by: FAMILY MEDICINE

## 2024-08-02 PROCEDURE — 36415 COLL VENOUS BLD VENIPUNCTURE: CPT | Performed by: FAMILY MEDICINE

## 2024-08-02 ASSESSMENT — ENCOUNTER SYMPTOMS
CONSTIPATION: 0
DIARRHEA: 0
BLOOD IN STOOL: 0
SHORTNESS OF BREATH: 0
ABDOMINAL PAIN: 0

## 2024-08-02 NOTE — PROGRESS NOTES
Sandra J Spalla, a female of 63 y.o. came to the office 8/2/2024.     Patient Active Problem List   Diagnosis    DCIS (ductal carcinoma in situ) of breast    Personal history of breast cancer    Primary hypertension    Pure hypercholesterolemia    Pneumonia    Acute respiratory failure with hypoxia (HCC)    Asthma    IgG3 deficiency (HCC)    Hypogammaglobulinemia (HCC)          Hypertension  This is a chronic problem. The current episode started more than 1 year ago. The problem has been resolved since onset. The problem is controlled. Pertinent negatives include no chest pain, headaches, malaise/fatigue, palpitations, peripheral edema or shortness of breath. Past treatments include diuretics. The current treatment provides significant improvement. There are no compliance problems.    Hyperlipidemia  This is a chronic problem. The current episode started more than 1 year ago. The problem is controlled. Pertinent negatives include no chest pain, leg pain, myalgias or shortness of breath. Current antihyperlipidemic treatment includes statins. The current treatment provides significant improvement of lipids. There are no compliance problems.    Skin Problem  This is a new problem. The current episode started more than 1 month ago. The problem has been waxing and waning since onset. The affected locations include the left upper leg. The rash is characterized by peeling. She was exposed to nothing. Pertinent negatives include no diarrhea or shortness of breath. Past treatments include nothing.        Allergies   Allergen Reactions    Elemental Sulfur Anaphylaxis and Other (See Comments)     Sores in mouth, throat closed    Doxycycline     Sulfa Antibiotics        Current Outpatient Medications on File Prior to Visit   Medication Sig Dispense Refill    fluticasone-umeclidin-vilant (TRELEGY ELLIPTA) 200-62.5-25 MCG/ACT AEPB inhaler Inhale 1 puff into the lungs daily 3 each 3    hydroCHLOROthiazide (HYDRODIURIL) 25 MG

## 2024-09-03 RX ORDER — HYDROCHLOROTHIAZIDE 25 MG/1
25 TABLET ORAL DAILY
Qty: 30 TABLET | Refills: 5 | Status: SHIPPED | OUTPATIENT
Start: 2024-09-03

## 2024-09-03 RX ORDER — ATORVASTATIN CALCIUM 20 MG/1
TABLET, FILM COATED ORAL
Qty: 30 TABLET | Refills: 5 | Status: SHIPPED | OUTPATIENT
Start: 2024-09-03

## 2024-09-23 ENCOUNTER — HOSPITAL ENCOUNTER (OUTPATIENT)
Dept: GENERAL RADIOLOGY | Age: 64
Discharge: HOME | End: 2024-09-25
Attending: FAMILY MEDICINE
Payer: COMMERCIAL

## 2024-09-23 VITALS — BODY MASS INDEX: 31.83 KG/M2 | HEIGHT: 62 IN | WEIGHT: 173 LBS

## 2024-09-23 DIAGNOSIS — Z12.31 BREAST CANCER SCREENING BY MAMMOGRAM: ICD-10-CM

## 2024-09-23 PROCEDURE — 77063 BREAST TOMOSYNTHESIS BI: CPT

## 2024-09-26 RX ORDER — ATORVASTATIN CALCIUM 20 MG/1
20 TABLET, FILM COATED ORAL DAILY
Qty: 30 TABLET | Refills: 5 | Status: SHIPPED | OUTPATIENT
Start: 2024-09-26

## 2024-09-26 RX ORDER — HYDROCHLOROTHIAZIDE 25 MG/1
25 TABLET ORAL DAILY
Qty: 30 TABLET | Refills: 5 | Status: SHIPPED | OUTPATIENT
Start: 2024-09-26

## 2024-10-24 ENCOUNTER — OFFICE VISIT (OUTPATIENT)
Age: 64
End: 2024-10-24
Payer: COMMERCIAL

## 2024-10-24 VITALS
HEART RATE: 89 BPM | HEIGHT: 62 IN | SYSTOLIC BLOOD PRESSURE: 130 MMHG | WEIGHT: 178 LBS | BODY MASS INDEX: 32.76 KG/M2 | DIASTOLIC BLOOD PRESSURE: 86 MMHG | OXYGEN SATURATION: 96 % | TEMPERATURE: 97.7 F

## 2024-10-24 DIAGNOSIS — J01.90 ACUTE BACTERIAL SINUSITIS: Primary | ICD-10-CM

## 2024-10-24 DIAGNOSIS — B96.89 ACUTE BACTERIAL SINUSITIS: Primary | ICD-10-CM

## 2024-10-24 PROCEDURE — 3079F DIAST BP 80-89 MM HG: CPT | Performed by: PHYSICIAN ASSISTANT

## 2024-10-24 PROCEDURE — 99213 OFFICE O/P EST LOW 20 MIN: CPT | Performed by: PHYSICIAN ASSISTANT

## 2024-10-24 PROCEDURE — 3075F SYST BP GE 130 - 139MM HG: CPT | Performed by: PHYSICIAN ASSISTANT

## 2024-10-24 NOTE — PROGRESS NOTES
Chief Complaint   Sinus Problem (X 2 wks, facial pressure), Fatigue, Headache, and Nausea    History of Present Illness   Source of history provided by: patient.    Sandra J Spalla is a 63 y.o. old female who has a past medical history of:   Past Medical History:   Diagnosis Date    Asthma     Breast cancer (HCC)     Cancer (HCC) 01/14/2013    Left breast DCIS on bx    Endometriosis     GERD (gastroesophageal reflux disease)     History of therapeutic radiation     Hx of Doppler ultrasound     10/01/1998 Normal Cardio Echo    Hyperlipidemia     Hypertension     Low vitamin D level     Osteopenia     Reactive airway disease 03/2020    Having symptoms intermittenly since March 2020    Vision disturbance     left eye - cataract   Presents to the walk in clinic for evaluation of facial pressure, congestion, headaches, nausea, and fatigue x 2 weeks. Denies any CP, dyspnea, LE edema, abdominal pain, vomiting, rash, or lethargy. Denies fever, chills, or body aches. Denies cough. Has been taking Tylenol without symptomatic relief. Reports history of asthma. Uses Trelegy and albuterol PRN. Denies hx of tobacco use.     ROS   Pertinent positives and negatives are stated within HPI, all other systems reviewed and are negative.    Surgical History:  has a past surgical history that includes Tonsillectomy; Breast biopsy (Left, 01/14/2013); Breast surgery (Left, 02/27/2013); Upper gastrointestinal endoscopy (2005); Colonoscopy (2010); Colonoscopy (03/01/2017); Upper gastrointestinal endoscopy (04/15/2005); eye surgery; laparoscopy (1991); and Breast lumpectomy.  Social History:  reports that she has never smoked. She has never used smokeless tobacco. She reports that she does not currently use alcohol. She reports that she does not use drugs.  Family History: family history includes Arthritis in her maternal grandmother, paternal aunt, and paternal grandmother; Cancer in her paternal grandmother; Diabetes in her paternal

## 2025-01-31 SDOH — HEALTH STABILITY: PHYSICAL HEALTH: ON AVERAGE, HOW MANY MINUTES DO YOU ENGAGE IN EXERCISE AT THIS LEVEL?: 20 MIN

## 2025-01-31 SDOH — HEALTH STABILITY: PHYSICAL HEALTH: ON AVERAGE, HOW MANY DAYS PER WEEK DO YOU ENGAGE IN MODERATE TO STRENUOUS EXERCISE (LIKE A BRISK WALK)?: 5 DAYS

## 2025-01-31 ASSESSMENT — PATIENT HEALTH QUESTIONNAIRE - PHQ9
SUM OF ALL RESPONSES TO PHQ9 QUESTIONS 1 & 2: 0
1. LITTLE INTEREST OR PLEASURE IN DOING THINGS: NOT AT ALL
SUM OF ALL RESPONSES TO PHQ QUESTIONS 1-9: 0
2. FEELING DOWN, DEPRESSED OR HOPELESS: NOT AT ALL
SUM OF ALL RESPONSES TO PHQ QUESTIONS 1-9: 0

## 2025-01-31 ASSESSMENT — LIFESTYLE VARIABLES
HOW MANY STANDARD DRINKS CONTAINING ALCOHOL DO YOU HAVE ON A TYPICAL DAY: 1 OR 2
HOW OFTEN DO YOU HAVE A DRINK CONTAINING ALCOHOL: 2-4 TIMES A MONTH
HOW MANY STANDARD DRINKS CONTAINING ALCOHOL DO YOU HAVE ON A TYPICAL DAY: 1
HOW OFTEN DO YOU HAVE SIX OR MORE DRINKS ON ONE OCCASION: 1
HOW OFTEN DO YOU HAVE A DRINK CONTAINING ALCOHOL: 3

## 2025-02-04 SDOH — ECONOMIC STABILITY: FOOD INSECURITY: WITHIN THE PAST 12 MONTHS, YOU WORRIED THAT YOUR FOOD WOULD RUN OUT BEFORE YOU GOT MONEY TO BUY MORE.: NEVER TRUE

## 2025-02-04 SDOH — ECONOMIC STABILITY: INCOME INSECURITY: IN THE LAST 12 MONTHS, WAS THERE A TIME WHEN YOU WERE NOT ABLE TO PAY THE MORTGAGE OR RENT ON TIME?: NO

## 2025-02-04 SDOH — ECONOMIC STABILITY: FOOD INSECURITY: WITHIN THE PAST 12 MONTHS, THE FOOD YOU BOUGHT JUST DIDN'T LAST AND YOU DIDN'T HAVE MONEY TO GET MORE.: NEVER TRUE

## 2025-02-04 SDOH — ECONOMIC STABILITY: TRANSPORTATION INSECURITY
IN THE PAST 12 MONTHS, HAS THE LACK OF TRANSPORTATION KEPT YOU FROM MEDICAL APPOINTMENTS OR FROM GETTING MEDICATIONS?: NO

## 2025-02-07 ENCOUNTER — OFFICE VISIT (OUTPATIENT)
Dept: PRIMARY CARE CLINIC | Age: 65
End: 2025-02-07

## 2025-02-07 VITALS
BODY MASS INDEX: 33.03 KG/M2 | SYSTOLIC BLOOD PRESSURE: 118 MMHG | HEART RATE: 74 BPM | DIASTOLIC BLOOD PRESSURE: 86 MMHG | WEIGHT: 179.5 LBS | OXYGEN SATURATION: 99 % | TEMPERATURE: 97.6 F | HEIGHT: 62 IN

## 2025-02-07 DIAGNOSIS — E78.00 PURE HYPERCHOLESTEROLEMIA: ICD-10-CM

## 2025-02-07 DIAGNOSIS — I10 PRIMARY HYPERTENSION: Primary | ICD-10-CM

## 2025-02-07 LAB
ALBUMIN: 4.1 G/DL (ref 3.5–5.2)
ALP BLD-CCNC: 95 U/L (ref 35–104)
ALT SERPL-CCNC: 19 U/L (ref 0–32)
ANION GAP SERPL CALCULATED.3IONS-SCNC: 10 MMOL/L (ref 7–16)
AST SERPL-CCNC: 18 U/L (ref 0–31)
BILIRUB SERPL-MCNC: 0.7 MG/DL (ref 0–1.2)
BUN BLDV-MCNC: 14 MG/DL (ref 6–23)
CALCIUM SERPL-MCNC: 10.2 MG/DL (ref 8.6–10.2)
CHLORIDE BLD-SCNC: 97 MMOL/L (ref 98–107)
CHOLESTEROL, TOTAL: 148 MG/DL
CO2: 31 MMOL/L (ref 22–29)
CREAT SERPL-MCNC: 0.9 MG/DL (ref 0.5–1)
GFR, ESTIMATED: 75 ML/MIN/1.73M2
GLUCOSE BLD-MCNC: 89 MG/DL (ref 74–99)
HCT VFR BLD CALC: 43.9 % (ref 34–48)
HDLC SERPL-MCNC: 58 MG/DL
HEMOGLOBIN: 14 G/DL (ref 11.5–15.5)
LDL CHOLESTEROL: 79 MG/DL
MCH RBC QN AUTO: 28 PG (ref 26–35)
MCHC RBC AUTO-ENTMCNC: 31.9 G/DL (ref 32–34.5)
MCV RBC AUTO: 87.8 FL (ref 80–99.9)
PDW BLD-RTO: 13.7 % (ref 11.5–15)
PLATELET # BLD: 342 K/UL (ref 130–450)
PMV BLD AUTO: 9.6 FL (ref 7–12)
POTASSIUM SERPL-SCNC: 4.1 MMOL/L (ref 3.5–5)
RBC # BLD: 5 M/UL (ref 3.5–5.5)
SODIUM BLD-SCNC: 138 MMOL/L (ref 132–146)
TOTAL PROTEIN: 7.6 G/DL (ref 6.4–8.3)
TRIGL SERPL-MCNC: 53 MG/DL
VLDLC SERPL CALC-MCNC: 11 MG/DL
WBC # BLD: 10.7 K/UL (ref 4.5–11.5)

## 2025-02-07 PROCEDURE — 99213 OFFICE O/P EST LOW 20 MIN: CPT | Performed by: FAMILY MEDICINE

## 2025-02-07 PROCEDURE — 1123F ACP DISCUSS/DSCN MKR DOCD: CPT | Performed by: FAMILY MEDICINE

## 2025-02-07 PROCEDURE — 36415 COLL VENOUS BLD VENIPUNCTURE: CPT | Performed by: FAMILY MEDICINE

## 2025-02-07 PROCEDURE — 3079F DIAST BP 80-89 MM HG: CPT | Performed by: FAMILY MEDICINE

## 2025-02-07 PROCEDURE — 3074F SYST BP LT 130 MM HG: CPT | Performed by: FAMILY MEDICINE

## 2025-02-07 RX ORDER — HYDROXYZINE PAMOATE 25 MG/1
25 CAPSULE ORAL 3 TIMES DAILY PRN
Qty: 6 CAPSULE | Refills: 0 | Status: SHIPPED | OUTPATIENT
Start: 2025-02-07

## 2025-02-07 ASSESSMENT — PATIENT HEALTH QUESTIONNAIRE - PHQ9
10. IF YOU CHECKED OFF ANY PROBLEMS, HOW DIFFICULT HAVE THESE PROBLEMS MADE IT FOR YOU TO DO YOUR WORK, TAKE CARE OF THINGS AT HOME, OR GET ALONG WITH OTHER PEOPLE: NOT DIFFICULT AT ALL
SUM OF ALL RESPONSES TO PHQ9 QUESTIONS 1 & 2: 0
9. THOUGHTS THAT YOU WOULD BE BETTER OFF DEAD, OR OF HURTING YOURSELF: NOT AT ALL
SUM OF ALL RESPONSES TO PHQ QUESTIONS 1-9: 0
SUM OF ALL RESPONSES TO PHQ QUESTIONS 1-9: 0
5. POOR APPETITE OR OVEREATING: NOT AT ALL
3. TROUBLE FALLING OR STAYING ASLEEP: NOT AT ALL
4. FEELING TIRED OR HAVING LITTLE ENERGY: NOT AT ALL
8. MOVING OR SPEAKING SO SLOWLY THAT OTHER PEOPLE COULD HAVE NOTICED. OR THE OPPOSITE, BEING SO FIGETY OR RESTLESS THAT YOU HAVE BEEN MOVING AROUND A LOT MORE THAN USUAL: NOT AT ALL
7. TROUBLE CONCENTRATING ON THINGS, SUCH AS READING THE NEWSPAPER OR WATCHING TELEVISION: NOT AT ALL
SUM OF ALL RESPONSES TO PHQ QUESTIONS 1-9: 0
1. LITTLE INTEREST OR PLEASURE IN DOING THINGS: NOT AT ALL
SUM OF ALL RESPONSES TO PHQ QUESTIONS 1-9: 0
2. FEELING DOWN, DEPRESSED OR HOPELESS: NOT AT ALL
6. FEELING BAD ABOUT YOURSELF - OR THAT YOU ARE A FAILURE OR HAVE LET YOURSELF OR YOUR FAMILY DOWN: NOT AT ALL

## 2025-02-07 ASSESSMENT — ENCOUNTER SYMPTOMS: SHORTNESS OF BREATH: 0

## 2025-02-07 NOTE — PROGRESS NOTES
Sandra J Spalla, a female of 64 y.o. came to the office 2/7/2025.     Patient Active Problem List   Diagnosis    DCIS (ductal carcinoma in situ) of breast    Personal history of breast cancer    Primary hypertension    Pure hypercholesterolemia    Pneumonia    Acute respiratory failure with hypoxia    Asthma    IgG3 deficiency (HCC)    Hypogammaglobulinemia (HCC)          Hypertension  This is a chronic problem. The current episode started more than 1 year ago. The problem is controlled. Pertinent negatives include no anxiety, chest pain, headaches, malaise/fatigue, palpitations, peripheral edema or shortness of breath. Past treatments include diuretics. The current treatment provides significant improvement. There are no compliance problems.    Hyperlipidemia  This is a chronic problem. The current episode started more than 1 year ago. The problem is controlled. Pertinent negatives include no chest pain, leg pain, myalgias or shortness of breath. Current antihyperlipidemic treatment includes statins. The current treatment provides moderate improvement of lipids. There are no compliance problems.         Allergies   Allergen Reactions    Elemental Sulfur Anaphylaxis and Other (See Comments)     Sores in mouth, throat closed    Doxycycline     Sulfa Antibiotics        Current Outpatient Medications on File Prior to Visit   Medication Sig Dispense Refill    hydroCHLOROthiazide (HYDRODIURIL) 25 MG tablet Take 1 tablet by mouth daily 30 tablet 5    atorvastatin (LIPITOR) 20 MG tablet Take 1 tablet by mouth daily 30 tablet 5    fluticasone-umeclidin-vilant (TRELEGY ELLIPTA) 200-62.5-25 MCG/ACT AEPB inhaler Inhale 1 puff into the lungs daily 3 each 3    albuterol sulfate HFA (PROVENTIL;VENTOLIN;PROAIR) 108 (90 Base) MCG/ACT inhaler INHALE 2 PUFFS INTO THE LUNGS EVERY 6 HOURS AS NEEDED FOR WHEEZING 6.7 g 3    omeprazole (PRILOSEC) 20 MG delayed release capsule Take 1 capsule by mouth daily      Cholecalciferol (VITAMIN D3)

## 2025-02-18 ENCOUNTER — TELEPHONE (OUTPATIENT)
Dept: PRIMARY CARE CLINIC | Age: 65
End: 2025-02-18

## 2025-02-18 RX ORDER — HYDROCHLOROTHIAZIDE 25 MG/1
25 TABLET ORAL DAILY
Qty: 90 TABLET | Refills: 1 | Status: SHIPPED | OUTPATIENT
Start: 2025-02-18

## 2025-02-18 RX ORDER — ATORVASTATIN CALCIUM 20 MG/1
20 TABLET, FILM COATED ORAL DAILY
Qty: 90 TABLET | Refills: 1 | Status: SHIPPED | OUTPATIENT
Start: 2025-02-18

## 2025-02-21 DIAGNOSIS — J45.52 SEVERE PERSISTENT ASTHMA WITH STATUS ASTHMATICUS (HCC): ICD-10-CM

## 2025-02-21 RX ORDER — HYDROCHLOROTHIAZIDE 25 MG/1
25 TABLET ORAL DAILY
Qty: 90 TABLET | Refills: 1 | OUTPATIENT
Start: 2025-02-21

## 2025-02-21 RX ORDER — ATORVASTATIN CALCIUM 20 MG/1
20 TABLET, FILM COATED ORAL DAILY
Qty: 90 TABLET | Refills: 1 | OUTPATIENT
Start: 2025-02-21

## 2025-06-09 ENCOUNTER — OFFICE VISIT (OUTPATIENT)
Dept: FAMILY MEDICINE CLINIC | Age: 65
End: 2025-06-09
Payer: COMMERCIAL

## 2025-06-09 VITALS
HEIGHT: 62 IN | OXYGEN SATURATION: 97 % | WEIGHT: 173 LBS | TEMPERATURE: 96.9 F | DIASTOLIC BLOOD PRESSURE: 78 MMHG | HEART RATE: 93 BPM | RESPIRATION RATE: 16 BRPM | SYSTOLIC BLOOD PRESSURE: 130 MMHG | BODY MASS INDEX: 31.83 KG/M2

## 2025-06-09 DIAGNOSIS — J45.901 ACUTE BRONCHITIS WITH ASTHMA WITH ACUTE EXACERBATION: Primary | ICD-10-CM

## 2025-06-09 DIAGNOSIS — J20.9 ACUTE BRONCHITIS WITH ASTHMA WITH ACUTE EXACERBATION: Primary | ICD-10-CM

## 2025-06-09 PROCEDURE — 99214 OFFICE O/P EST MOD 30 MIN: CPT

## 2025-06-09 PROCEDURE — 3078F DIAST BP <80 MM HG: CPT

## 2025-06-09 PROCEDURE — 1123F ACP DISCUSS/DSCN MKR DOCD: CPT

## 2025-06-09 PROCEDURE — 3075F SYST BP GE 130 - 139MM HG: CPT

## 2025-06-09 RX ORDER — CEFDINIR 300 MG/1
300 CAPSULE ORAL EVERY 12 HOURS
Qty: 20 CAPSULE | Refills: 0 | Status: SHIPPED | OUTPATIENT
Start: 2025-06-09 | End: 2025-06-19

## 2025-06-09 RX ORDER — PREDNISONE 10 MG/1
TABLET ORAL
Qty: 18 TABLET | Refills: 0 | Status: SHIPPED | OUTPATIENT
Start: 2025-06-09 | End: 2025-06-18

## 2025-06-09 NOTE — PROGRESS NOTES
Chief Complaint       Cough (Started Wednesday )    History of Present Illness   Source of history provided by:  patient.  History of Present Illness  The patient is a 64-year-old female who presents for evaluation of a respiratory infection.    She recently returned from [place] and suspects she may have contracted an illness during her trip. Her symptoms began on 06/04/2025, coinciding with her return. She experienced a fever that subsided approximately a day ago and has been producing green mucus. Despite taking Mucinex DM and Tylenol, she reports persistent symptoms. She also reports a sensation of warmth throughout her abdominal area during bowel movements, which she attributes to potential gastrointestinal irritation or inflammation caused by Mucinex. She does not report any abdominal pain. She is currently on Trelegy for asthma management and has been using it consistently. She also mentions a low IgG level and expresses a desire for further evaluation.    All other ROS negative unless otherwise stated in HPI.      ROS    Unless otherwise stated in this report or unable to obtain because of the patient's clinical or mental status as evidenced by the medical record, this patients's positive and negative responses for Review of Systems, constitutional, psych, eyes, ENT, cardiovascular, respiratory, gastrointestinal, neurological, genitourinary, musculoskeletal, integument systems and systems related to the presenting problem are either stated in the preceding or were not pertinent or were negative for the symptoms and/or complaints related to the medical problem.      Physical Exam         VS:  /78   Pulse 93   Temp 96.9 °F (36.1 °C)   Resp 16   Ht 1.575 m (5' 2\")   Wt 78.5 kg (173 lb)   SpO2 97%   BMI 31.64 kg/m²    Oxygen Saturation Interpretation: Normal.    Constitutional:  Alert, development consistent with age.  Ears:  External Ears: Bilateral pinna normal. TMs translucent without erythema

## 2025-07-02 RX ORDER — HYDROCHLOROTHIAZIDE 25 MG/1
25 TABLET ORAL DAILY
Qty: 90 TABLET | Refills: 1 | Status: SHIPPED | OUTPATIENT
Start: 2025-07-02

## 2025-07-02 RX ORDER — ATORVASTATIN CALCIUM 20 MG/1
20 TABLET, FILM COATED ORAL DAILY
Qty: 90 TABLET | Refills: 1 | Status: SHIPPED | OUTPATIENT
Start: 2025-07-02

## 2025-08-08 ENCOUNTER — OFFICE VISIT (OUTPATIENT)
Dept: PRIMARY CARE CLINIC | Age: 65
End: 2025-08-08
Payer: COMMERCIAL

## 2025-08-08 VITALS
RESPIRATION RATE: 20 BRPM | BODY MASS INDEX: 32.1 KG/M2 | OXYGEN SATURATION: 99 % | SYSTOLIC BLOOD PRESSURE: 128 MMHG | HEART RATE: 65 BPM | DIASTOLIC BLOOD PRESSURE: 84 MMHG | TEMPERATURE: 97.3 F | WEIGHT: 175.5 LBS

## 2025-08-08 DIAGNOSIS — D80.3 IGG DEFICIENCY (HCC): ICD-10-CM

## 2025-08-08 DIAGNOSIS — I10 PRIMARY HYPERTENSION: Primary | ICD-10-CM

## 2025-08-08 DIAGNOSIS — E78.00 PURE HYPERCHOLESTEROLEMIA: ICD-10-CM

## 2025-08-08 DIAGNOSIS — R51.9 INTRACTABLE EPISODIC HEADACHE, UNSPECIFIED HEADACHE TYPE: ICD-10-CM

## 2025-08-08 PROBLEM — J96.01 ACUTE RESPIRATORY FAILURE WITH HYPOXIA (HCC): Status: RESOLVED | Noted: 2022-09-12 | Resolved: 2025-08-08

## 2025-08-08 PROCEDURE — 1123F ACP DISCUSS/DSCN MKR DOCD: CPT | Performed by: FAMILY MEDICINE

## 2025-08-08 PROCEDURE — 3079F DIAST BP 80-89 MM HG: CPT | Performed by: FAMILY MEDICINE

## 2025-08-08 PROCEDURE — 99214 OFFICE O/P EST MOD 30 MIN: CPT | Performed by: FAMILY MEDICINE

## 2025-08-08 PROCEDURE — 3074F SYST BP LT 130 MM HG: CPT | Performed by: FAMILY MEDICINE

## 2025-08-08 ASSESSMENT — ENCOUNTER SYMPTOMS: SHORTNESS OF BREATH: 0

## 2025-08-11 DIAGNOSIS — E78.00 PURE HYPERCHOLESTEROLEMIA: ICD-10-CM

## 2025-08-11 DIAGNOSIS — I10 PRIMARY HYPERTENSION: ICD-10-CM

## 2025-08-11 LAB
ALBUMIN: 4.1 G/DL (ref 3.5–5.2)
ALP BLD-CCNC: 81 U/L (ref 35–104)
ALT SERPL-CCNC: 16 U/L (ref 0–35)
ANION GAP SERPL CALCULATED.3IONS-SCNC: 13 MMOL/L (ref 7–16)
AST SERPL-CCNC: 19 U/L (ref 0–35)
BILIRUB SERPL-MCNC: 0.5 MG/DL (ref 0–1.2)
BUN BLDV-MCNC: 12 MG/DL (ref 8–23)
CALCIUM SERPL-MCNC: 10.2 MG/DL (ref 8.8–10.2)
CHLORIDE BLD-SCNC: 103 MMOL/L (ref 98–107)
CHOLESTEROL, TOTAL: 155 MG/DL
CO2: 27 MMOL/L (ref 22–29)
CREAT SERPL-MCNC: 0.8 MG/DL (ref 0.5–1)
GFR, ESTIMATED: 76 ML/MIN/1.73M2
GLUCOSE BLD-MCNC: 115 MG/DL (ref 74–99)
HCT VFR BLD CALC: 43.2 % (ref 34–48)
HDLC SERPL-MCNC: 55 MG/DL
HEMOGLOBIN: 13.7 G/DL (ref 11.5–15.5)
LDL CHOLESTEROL: 89 MG/DL
MCH RBC QN AUTO: 28.2 PG (ref 26–35)
MCHC RBC AUTO-ENTMCNC: 31.7 G/DL (ref 32–34.5)
MCV RBC AUTO: 88.9 FL (ref 80–99.9)
PDW BLD-RTO: 13.9 % (ref 11.5–15)
PLATELET # BLD: 334 K/UL (ref 130–450)
PMV BLD AUTO: 9.5 FL (ref 7–12)
POTASSIUM SERPL-SCNC: 4 MMOL/L (ref 3.5–5.1)
RBC # BLD: 4.86 M/UL (ref 3.5–5.5)
SODIUM BLD-SCNC: 142 MMOL/L (ref 136–145)
TOTAL PROTEIN: 7.3 G/DL (ref 6.4–8.3)
TRIGL SERPL-MCNC: 54 MG/DL
VLDLC SERPL CALC-MCNC: 11 MG/DL
WBC # BLD: 10.8 K/UL (ref 4.5–11.5)